# Patient Record
Sex: FEMALE | Race: WHITE | NOT HISPANIC OR LATINO | Employment: FULL TIME | URBAN - METROPOLITAN AREA
[De-identification: names, ages, dates, MRNs, and addresses within clinical notes are randomized per-mention and may not be internally consistent; named-entity substitution may affect disease eponyms.]

---

## 2017-06-10 ENCOUNTER — TRANSFERRED RECORDS (OUTPATIENT)
Dept: HEALTH INFORMATION MANAGEMENT | Facility: CLINIC | Age: 37
End: 2017-06-10

## 2017-07-03 LAB — PAP-ABSTRACT: NORMAL

## 2017-07-20 ENCOUNTER — OFFICE VISIT (OUTPATIENT)
Dept: FAMILY MEDICINE | Facility: CLINIC | Age: 37
End: 2017-07-20
Payer: COMMERCIAL

## 2017-07-20 VITALS
SYSTOLIC BLOOD PRESSURE: 134 MMHG | TEMPERATURE: 98.3 F | DIASTOLIC BLOOD PRESSURE: 92 MMHG | HEIGHT: 62 IN | WEIGHT: 220 LBS | BODY MASS INDEX: 40.48 KG/M2 | HEART RATE: 104 BPM

## 2017-07-20 DIAGNOSIS — Z00.00 ENCOUNTER FOR ROUTINE ADULT HEALTH EXAMINATION WITHOUT ABNORMAL FINDINGS: Primary | ICD-10-CM

## 2017-07-20 DIAGNOSIS — I10 BENIGN ESSENTIAL HYPERTENSION: ICD-10-CM

## 2017-07-20 DIAGNOSIS — R73.03 PRE-DIABETES: ICD-10-CM

## 2017-07-20 DIAGNOSIS — E66.01 MORBID OBESITY, UNSPECIFIED OBESITY TYPE (H): ICD-10-CM

## 2017-07-20 DIAGNOSIS — Z13.6 CARDIOVASCULAR SCREENING; LDL GOAL LESS THAN 130: ICD-10-CM

## 2017-07-20 DIAGNOSIS — R11.0 NAUSEA: ICD-10-CM

## 2017-07-20 DIAGNOSIS — Z23 NEED FOR TDAP VACCINATION: ICD-10-CM

## 2017-07-20 DIAGNOSIS — Z71.6 ENCOUNTER FOR SMOKING CESSATION COUNSELING: ICD-10-CM

## 2017-07-20 LAB — HBA1C MFR BLD: 5.9 % (ref 4.3–6)

## 2017-07-20 PROCEDURE — 90715 TDAP VACCINE 7 YRS/> IM: CPT | Performed by: NURSE PRACTITIONER

## 2017-07-20 PROCEDURE — 99213 OFFICE O/P EST LOW 20 MIN: CPT | Mod: 25 | Performed by: NURSE PRACTITIONER

## 2017-07-20 PROCEDURE — 83036 HEMOGLOBIN GLYCOSYLATED A1C: CPT | Performed by: NURSE PRACTITIONER

## 2017-07-20 PROCEDURE — 90471 IMMUNIZATION ADMIN: CPT | Performed by: NURSE PRACTITIONER

## 2017-07-20 PROCEDURE — 99395 PREV VISIT EST AGE 18-39: CPT | Mod: 25 | Performed by: NURSE PRACTITIONER

## 2017-07-20 PROCEDURE — 84443 ASSAY THYROID STIM HORMONE: CPT | Performed by: NURSE PRACTITIONER

## 2017-07-20 PROCEDURE — 80053 COMPREHEN METABOLIC PANEL: CPT | Performed by: NURSE PRACTITIONER

## 2017-07-20 PROCEDURE — 36415 COLL VENOUS BLD VENIPUNCTURE: CPT | Performed by: NURSE PRACTITIONER

## 2017-07-20 RX ORDER — VARENICLINE TARTRATE 1 MG/1
1 TABLET, FILM COATED ORAL 2 TIMES DAILY
Qty: 60 TABLET | Refills: 2 | Status: SHIPPED | OUTPATIENT
Start: 2017-07-20 | End: 2017-07-25

## 2017-07-20 RX ORDER — LISINOPRIL 10 MG/1
10 TABLET ORAL DAILY
Qty: 90 TABLET | Refills: 1 | Status: SHIPPED | OUTPATIENT
Start: 2017-07-20 | End: 2018-01-31

## 2017-07-20 RX ORDER — HYDROCHLOROTHIAZIDE 12.5 MG/1
25 TABLET ORAL DAILY
COMMUNITY
End: 2017-07-20

## 2017-07-20 RX ORDER — DEXTROAMPHETAMINE SACCHARATE, AMPHETAMINE ASPARTATE, DEXTROAMPHETAMINE SULFATE AND AMPHETAMINE SULFATE 5; 5; 5; 5 MG/1; MG/1; MG/1; MG/1
TABLET ORAL
COMMUNITY
Start: 2017-06-12 | End: 2017-11-01

## 2017-07-20 RX ORDER — ONDANSETRON 4 MG/1
4 TABLET, FILM COATED ORAL EVERY 6 HOURS PRN
Qty: 30 TABLET | Refills: 3 | Status: SHIPPED | OUTPATIENT
Start: 2017-07-20 | End: 2017-10-30

## 2017-07-20 RX ORDER — HYDROCHLOROTHIAZIDE 12.5 MG/1
12.5 TABLET ORAL DAILY
Qty: 90 TABLET | Refills: 1 | Status: SHIPPED | OUTPATIENT
Start: 2017-07-20 | End: 2018-01-31

## 2017-07-20 RX ORDER — NEBIVOLOL 10 MG/1
10 TABLET ORAL DAILY
Qty: 90 TABLET | Refills: 1 | Status: SHIPPED | OUTPATIENT
Start: 2017-07-20 | End: 2017-07-20

## 2017-07-20 RX ORDER — ONDANSETRON 4 MG/1
TABLET, FILM COATED ORAL
COMMUNITY
Start: 2017-07-05 | End: 2017-07-20

## 2017-07-20 RX ORDER — VARENICLINE TARTRATE 1 MG/1
1 TABLET, FILM COATED ORAL
COMMUNITY
Start: 2016-08-29 | End: 2017-07-20

## 2017-07-20 RX ORDER — PAROXETINE 20 MG/1
TABLET, FILM COATED ORAL
COMMUNITY
Start: 2017-06-12 | End: 2017-11-01

## 2017-07-20 RX ORDER — TRAZODONE HYDROCHLORIDE 50 MG/1
TABLET, FILM COATED ORAL
COMMUNITY
Start: 2017-06-29 | End: 2018-09-19

## 2017-07-20 NOTE — PATIENT INSTRUCTIONS
Start the lisinopril -recheck labs in 2 when fasting      Preventive Health Recommendations  Female Ages 26 - 39  Yearly exam:   See your health care provider every year in order to    Review health changes.     Discuss preventive care.      Review your medicines if you your doctor has prescribed any.    Until age 30: Get a Pap test every three years (more often if you have had an abnormal result).    After age 30: Talk to your doctor about whether you should have a Pap test every 3 years or have a Pap test with HPV screening every 5 years.   You do not need a Pap test if your uterus was removed (hysterectomy) and you have not had cancer.  You should be tested each year for STDs (sexually transmitted diseases), if you're at risk.   Talk to your provider about how often to have your cholesterol checked.  If you are at risk for diabetes, you should have a diabetes test (fasting glucose).  Shots: Get a flu shot each year. Get a tetanus shot every 10 years.   Nutrition:     Eat at least 5 servings of fruits and vegetables each day.    Eat whole-grain bread, whole-wheat pasta and brown rice instead of white grains and rice.    Talk to your provider about Calcium and Vitamin D.     Lifestyle    Exercise at least 150 minutes a week (30 minutes a day, 5 days of the week). This will help you control your weight and prevent disease.    Limit alcohol to one drink per day.    No smoking.     Wear sunscreen to prevent skin cancer.    See your dentist every six months for an exam and cleaning.

## 2017-07-20 NOTE — NURSING NOTE
"Chief Complaint   Patient presents with     Physical       Initial BP (!) 134/92 (Cuff Size: Adult Large)  Pulse 104  Temp 98.3  F (36.8  C) (Tympanic)  Ht 5' 2.25\" (1.581 m)  Wt 220 lb (99.8 kg)  LMP   BMI 39.92 kg/m2 Estimated body mass index is 39.92 kg/(m^2) as calculated from the following:    Height as of this encounter: 5' 2.25\" (1.581 m).    Weight as of this encounter: 220 lb (99.8 kg).  Medication Reconciliation: complete    Health Maintenance that is potentially due pending provider review:  Pap Smear    Pt had PAP done on this date 7/4/2017 , with this group [facility], results were normal        "

## 2017-07-20 NOTE — NURSING NOTE
Prior to injection verified patient identity using patient's name and date of birth.    Screening Questionnaire for Adult Immunization    Are you sick today?   No   Do you have allergies to medications, food, a vaccine component or latex?   No   Have you ever had a serious reaction after receiving a vaccination?   No   Do you have a long-term health problem with heart disease, lung disease, asthma, kidney disease, metabolic disease (e.g. diabetes), anemia, or other blood disorder?   No   Do you have cancer, leukemia, HIV/AIDS, or any other immune system problem?   No   In the past 3 months, have you taken medications that affect  your immune system, such as prednisone, other steroids, or anticancer drugs; drugs for the treatment of rheumatoid arthritis, Crohn s disease, or psoriasis; or have you had radiation treatments?   No   Have you had a seizure, or a brain or other nervous system problem?   No   During the past year, have you received a transfusion of blood or blood     products, or been given immune (gamma) globulin or antiviral drug?   No   For women: Are you pregnant or is there a chance you could become        pregnant during the next month?   No   Have you received any vaccinations in the past 4 weeks?   No     Immunization questionnaire answers were all negative.      MNVFC doesn't apply on this patient    Per orders of FAB Maldonado, injection of Tdap given by Hetal Sears. Patient instructed to remain in clinic for 15 minutes afterwards, and to report any adverse reaction to me immediately.       Screening performed by Hetal Sears on 7/20/2017 at 12:40 PM.

## 2017-07-20 NOTE — MR AVS SNAPSHOT
After Visit Summary   7/20/2017    Alma Mae    MRN: [id number]           Patient Information     Date Of Birth          1980        Visit Information        Provider Department      7/20/2017 11:20 AM Daniela Maldonado APRN [facility]        Today's Diagnoses     Pre-diabetes    -  1    Encounter for smoking cessation counseling        Encounter for routine adult health examination without abnormal findings        Benign essential hypertension        Nausea        CARDIOVASCULAR SCREENING; LDL GOAL LESS THAN 130          Care Instructions    Start the lisinopril -recheck labs in 2 when fasting      Preventive Health Recommendations  Female Ages 26 - 39  Yearly exam:   See your health care provider every year in order to    Review health changes.     Discuss preventive care.      Review your medicines if you your doctor has prescribed any.    Until age 30: Get a Pap test every three years (more often if you have had an abnormal result).    After age 30: Talk to your doctor about whether you should have a Pap test every 3 years or have a Pap test with HPV screening every 5 years.   You do not need a Pap test if your uterus was removed (hysterectomy) and you have not had cancer.  You should be tested each year for STDs (sexually transmitted diseases), if you're at risk.   Talk to your provider about how often to have your cholesterol checked.  If you are at risk for diabetes, you should have a diabetes test (fasting glucose).  Shots: Get a flu shot each year. Get a tetanus shot every 10 years.   Nutrition:     Eat at least 5 servings of fruits and vegetables each day.    Eat whole-grain bread, whole-wheat pasta and brown rice instead of white grains and rice.    Talk to your provider about Calcium and Vitamin D.     Lifestyle    Exercise at least 150 minutes a week (30 minutes a day, 5 days of the week). This will help you control your weight and prevent  "disease.    Limit alcohol to one drink per day.    No smoking.     Wear sunscreen to prevent skin cancer.    See your dentist every six months for an exam and cleaning.            Follow-ups after your visit        Future tests that were ordered for you today     Open Future Orders        Priority Expected Expires Ordered    Lipid panel reflex to direct LDL Routine  7/20/2018 7/20/2017    Basic metabolic panel Routine  7/20/2018 7/20/2017            Who to contact     If you have questions or need follow up information about today's clinic visit or your schedule please contact [facility] directly at [phone].  Normal or non-critical lab and imaging results will be communicated to you by Donordonuthart, letter or phone within 4 business days after the clinic has received the results. If you do not hear from us within 7 days, please contact the clinic through [facility] or phone. If you have a critical or abnormal lab result, we will notify you by phone as soon as possible.  Submit refill requests through [employer] or call your pharmacy and they will forward the refill request to us. Please allow 3 business days for your refill to be completed.          Additional Information About Your Visit        MyChart Information     [employer] gives you secure access to your electronic health record. If you see a primary care provider, you can also send messages to your care team and make appointments. If you have questions, please call your primary care clinic.  If you do not have a primary care provider, please call [phone] and they will assist you.        Care EveryWhere ID     This is your Care EveryWhere ID. This could be used by other organizations to access your [facility] records  [id number]        Your Vitals Were     Pulse Temperature Height BMI (Body Mass Index)          104 98.3  F (36.8  C) (Tympanic) 5' 2.25\" (1.581 m) 39.92 kg/m2         Blood Pressure from Last 3 Encounters:   07/20/17 (!) " 134/92    Weight from Last 3 Encounters:   07/20/17 220 lb (99.8 kg)              We Performed the Following     Comprehensive metabolic panel     Hemoglobin A1c     TSH with free T4 reflex          Today's Medication Changes          These changes are accurate as of: 7/20/17 12:31 PM.  If you have any questions, ask your nurse or doctor.               These medicines have changed or have updated prescriptions.        Dose/Directions    hydrochlorothiazide 12.5 MG Tabs tablet   This may have changed:  how much to take   Used for:  Benign essential hypertension   Changed by:  Daniela Maldonado APRN CNP        Dose:  12.5 mg   Take 1 tablet (12.5 mg) by mouth daily   Quantity:  90 tablet   Refills:  1       metFORMIN 500 MG tablet   Commonly known as:  GLUCOPHAGE   This may have changed:  when to take this   Used for:  Pre-diabetes   Changed by:  Daniela Maldonado APRN CNP        Dose:  500 mg   Take 1 tablet (500 mg) by mouth 2 times daily (with meals)   Quantity:  180 tablet   Refills:  1       ondansetron 4 MG tablet   Commonly known as:  ZOFRAN   This may have changed:  See the new instructions.   Used for:  Nausea   Changed by:  Daniela Maldonado APRN CNP        Dose:  4 mg   Take 1 tablet (4 mg) by mouth every 6 hours as needed for nausea   Quantity:  30 tablet   Refills:  3       varenicline 1 MG tablet   Commonly known as:  CHANTIX   This may have changed:  when to take this   Used for:  Encounter for smoking cessation counseling   Changed by:  Daniela Maldonado APRN CNP        Dose:  1 mg   Take 1 tablet (1 mg) by mouth 2 times daily   Quantity:  60 tablet   Refills:  2         Stop taking these medicines if you haven't already. Please contact your care team if you have questions.     BYSTOLIC PO   Stopped by:  Daniela Maldonado APRN CNP                Where to get your medicines      These medications were sent to [facility] [address] [place] [address] [address] [facility] AT  [address]  [address] [facility] [address]     Phone:  [phone]     hydrochlorothiazide 12.5 MG Tabs tablet    metFORMIN 500 MG tablet    ondansetron 4 MG tablet    varenicline 1 MG tablet                Primary Care Provider    None Specified       No primary provider on file.        Equal Access to Services     FRANCIA WELCH : Hadii analia collado hadasho Soomaali, waaxda luqadaha, qaybta kaalmada elishayada, shlomo srivastava. So [facility] [phone].    ATENCIÓN: Si habla español, tiene a graham disposición servicios gratuitos de asistencia lingüística. [facility] [phone].    We comply with applicable federal civil rights laws and [place] laws. We do not discriminate on the basis of race, color, national origin, age, disability sex, sexual orientation or gender identity.            Thank you!     Thank you for choosing [facility]  for your care. Our goal is always to provide you with excellent care. Hearing back from our patients is one way we can continue to improve our services. Please take a few minutes to complete the written survey that you may receive in the mail after your visit with us. Thank you!             Your Updated Medication List - Protect others around you: Learn how to safely use, store and throw away your medicines at www.disposemymeds.org.          This list is accurate as of: 7/20/17 12:31 PM.  Always use your most recent med list.                   Brand Name Dispense Instructions for use Diagnosis    amphetamine-dextroamphetamine 20 MG per tablet    ADDERALL     TK ONE T PO BID        hydrochlorothiazide 12.5 MG Tabs tablet     90 tablet    Take 1 tablet (12.5 mg) by mouth daily    Benign essential hypertension       metFORMIN 500 MG tablet    GLUCOPHAGE    180 tablet    Take 1 tablet (500 mg) by mouth 2 times daily (with meals)    Pre-diabetes       ondansetron 4 MG tablet    ZOFRAN    30 tablet    Take 1 tablet (4 mg) by  mouth every 6 hours as needed for nausea    Nausea       PARoxetine 20 MG tablet    PAXIL          traZODone 50 MG tablet    DESYREL     TK 1/2 TO 1 T PO QHS PRN        varenicline 1 MG tablet    CHANTIX    60 tablet    Take 1 tablet (1 mg) by mouth 2 times daily    Encounter for smoking cessation counseling

## 2017-07-20 NOTE — LETTER
Alma Hamptonder  [address] [facility] [address]        July 25, 2017          Dear ,    We are writing to inform you of your test results.    Your labs all looked good.  Your glucose was slightly elevated but you were not fasting so it was normal.    Resulted Orders   Comprehensive metabolic panel   Result Value Ref Range    Sodium 136 133 - 144 mmol/L    Potassium 4.2 3.4 - 5.3 mmol/L    Chloride 102 94 - 109 mmol/L    Carbon Dioxide 27 20 - 32 mmol/L    Anion Gap 7 3 - 14 mmol/L    Glucose 100 (H) 70 - 99 mg/dL    Urea Nitrogen 12 7 - 30 mg/dL    Creatinine 0.68 0.52 - 1.04 mg/dL    GFR Estimate >90  Non  GFR Calc   >60 mL/min/1.7m2    GFR Estimate If Black >90   GFR Calc   >60 mL/min/1.7m2    Calcium 8.9 8.5 - 10.1 mg/dL    Bilirubin Total 0.3 0.2 - 1.3 mg/dL    Albumin 4.2 3.4 - 5.0 g/dL    Protein Total 7.3 6.8 - 8.8 g/dL    Alkaline Phosphatase 67 40 - 150 U/L    ALT 46 0 - 50 U/L    AST 24 0 - 45 U/L   Hemoglobin A1c   Result Value Ref Range    Hemoglobin A1C 5.9 4.3 - 6.0 %   TSH with free T4 reflex   Result Value Ref Range    TSH 1.07 0.40 - 4.00 mU/L       If you have any questions or concerns, please call the clinic at the number listed above.       Sincerely,        SHRUTHI Arellano CNP

## 2017-07-20 NOTE — PROGRESS NOTES
SUBJECTIVE:   CC: Alma Mae is an 37 year old woman who presents for preventive health visit.     Healthy Habits:    Do you get at least three servings of calcium containing foods daily (dairy, green leafy vegetables, etc.)? yes    Amount of exercise or daily activities, outside of work: 2 day(s) per week    Problems taking medications regularly No    Medication side effects: Nausea for adderall     Have you had an eye exam in the past two years? yes    Do you see a dentist twice per year? yes  Do you have sleep apnea, excessive snoring or daytime drowsiness?no      Diabetes Follow-up      Patient is checking blood sugars: 1 times a week    Diabetic concerns: None     Symptoms of hypoglycemia (low blood sugar): none     Paresthesias (numbness or burning in feet) or sores: No     Date of last diabetic eye exam: 2015     Hypertension Follow-up      Outpatient blood pressures are being checked at home.  Results are 120/80-90's.- pt feels diastolic has been up since starting adderall     Low Salt Diet: low salt  Has not been taking her medications-watching numbers at home.    Medication Followup of Chantix     Taking Medication as prescribed: NO     Side Effects:  Vivid dreams     Medication Helping Symptoms:  Yes - helps with not smoking      Nausea  Gets nauseous with Adderall takes Zofran as needed for nausea.     Today's PHQ-2 Score:   PHQ-2 ( 1999 Pfizer) 7/20/2017   Q1: Little interest or pleasure in doing things 0   Q2: Feeling down, depressed or hopeless 0   PHQ-2 Score 0         Abuse: Current or Past(Physical, Sexual or Emotional)- No  Do you feel safe in your environment - Yes  Social History   Substance Use Topics     Smoking status: Current Every Day Smoker     Types: Cigarettes     Smokeless tobacco: Never Used     Alcohol use No     The patient does not drink >3 drinks per day nor >7 drinks per week.    Reviewed orders with patient.  Reviewed health maintenance and updated orders accordingly -  "Yes  Labs reviewed in EPIC    Mammogram not appropriate for this patient based on age.    Pertinent mammograms are reviewed under the imaging tab.  History of abnormal Pap smear: NO - age 30- 65 PAP every 3 years recommended    Reviewed and updated as needed this visit by clinical staff  Tobacco  Med Hx  Surg Hx  Fam Hx  Soc Hx        Reviewed and updated as needed this visit by Provider              ROS:CONSTITUTIONAL:NEGATIVE for fever, chills and POSITIVE  for weight gain  I: NEGATIVE for worrisome rashes, moles or lesions  E: NEGATIVE for vision changes or irritation  ENT: NEGATIVE for ear, mouth and throat problems  R: NEGATIVE for significant cough or SOB  B: NEGATIVE for masses, tenderness or discharge  CV: NEGATIVE for chest pain, palpitations or peripheral edema  GI: NEGATIVE for nausea, abdominal pain, heartburn, or change in bowel habits  : NEGATIVE for unusual urinary or vaginal symptoms. Periods are regular.  M: NEGATIVE for significant arthralgias or myalgia  N: NEGATIVE for weakness, dizziness or paresthesias  P: NEGATIVE for changes in mood or affect    OBJECTIVE:   BP (!) 134/92 (Cuff Size: Adult Large)  Pulse 104  Temp 98.3  F (36.8  C) (Tympanic)  Ht 5' 2.25\" (1.581 m)  Wt 220 lb (99.8 kg)  LMP   BMI 39.92 kg/m2  EXAM:  GENERAL: healthy, alert and no distress  EYES: Eyes grossly normal to inspection, PERRL and conjunctivae and sclerae normal  HENT: ear canals and TM's normal, nose and mouth without ulcers or lesions  NECK: no adenopathy, no asymmetry, masses, or scars and thyroid normal to palpation  RESP: lungs clear to auscultation - no rales, rhonchi or wheezes  CV: regular rate and rhythm, normal S1 S2, no S3 or S4, no murmur, click or rub, no peripheral edema and peripheral pulses strong  ABDOMEN: soft, nontender, no hepatosplenomegaly, no masses and bowel sounds normal  MS: no gross musculoskeletal defects noted, no edema  SKIN: no suspicious lesions or rashes  NEURO: Normal " "strength and tone, mentation intact and speech normal  PSYCH: mentation appears normal, affect normal/bright    ASSESSMENT/PLAN:   1. Encounter for routine adult health examination without abnormal findings    - TSH with free T4 reflex    2. Benign essential hypertension  Not controlled.  Start the Lisinopril for blood pressure. Labs and recheck in 2 weeks.  - hydrochlorothiazide 12.5 MG TABS tablet; Take 1 tablet (12.5 mg) by mouth daily  Dispense: 90 tablet; Refill: 1  - Basic metabolic panel; Future  - lisinopril (PRINIVIL/ZESTRIL) 10 MG tablet; Take 1 tablet (10 mg) by mouth daily  Dispense: 90 tablet; Refill: 1    3. Pre-diabetes  Controlled on medication.  A1C 5.9  - metFORMIN (GLUCOPHAGE) 500 MG tablet; Take 1 tablet (500 mg) by mouth 2 times daily (with meals)  Dispense: 180 tablet; Refill: 1  - Comprehensive metabolic panel  - Hemoglobin A1c    4. Morbid obesity, unspecified obesity type (H)  Discussion on starting to increase physical activity and eating well.  Works from home and get very little physical activity.    5. Encounter for smoking cessation counseling  Chantix refilled.    6. Nausea  Take Zofran as needed with Adderall-makes her nauseous.  - ondansetron (ZOFRAN) 4 MG tablet; Take 1 tablet (4 mg) by mouth every 6 hours as needed for nausea  Dispense: 30 tablet; Refill: 3    7. CARDIOVASCULAR SCREENING; LDL GOAL LESS THAN 130    - Lipid panel reflex to direct LDL; Future    8. Need for Tdap vaccination    - TDAP VACCINE (ADACEL)    COUNSELING:   Reviewed preventive health counseling, as reflected in patient instructions         reports that she has been smoking Cigarettes.  She has never used smokeless tobacco.  Tobacco Cessation Action Plan: Pharmacotherapies : Chantix  Estimated body mass index is 39.92 kg/(m^2) as calculated from the following:    Height as of this encounter: 5' 2.25\" (1.581 m).    Weight as of this encounter: 220 lb (99.8 kg).   Weight management plan: Discussed healthy diet " and exercise guidelines and patient will follow up in 12 months in clinic to re-evaluate.    Counseling Resources:  ATP IV Guidelines  Pooled Cohorts Equation Calculator  Breast Cancer Risk Calculator  FRAX Risk Assessment  ICSI Preventive Guidelines  Dietary Guidelines for Americans, 2010  USDA's MyPlate  ASA Prophylaxis  Lung CA Screening    SHRUTHI Arellano [facility]

## 2017-07-21 LAB
ALBUMIN SERPL-MCNC: 4.2 G/DL (ref 3.4–5)
ALP SERPL-CCNC: 67 U/L (ref 40–150)
ALT SERPL W P-5'-P-CCNC: 46 U/L (ref 0–50)
ANION GAP SERPL CALCULATED.3IONS-SCNC: 7 MMOL/L (ref 3–14)
AST SERPL W P-5'-P-CCNC: 24 U/L (ref 0–45)
BILIRUB SERPL-MCNC: 0.3 MG/DL (ref 0.2–1.3)
BUN SERPL-MCNC: 12 MG/DL (ref 7–30)
CALCIUM SERPL-MCNC: 8.9 MG/DL (ref 8.5–10.1)
CHLORIDE SERPL-SCNC: 102 MMOL/L (ref 94–109)
CO2 SERPL-SCNC: 27 MMOL/L (ref 20–32)
CREAT SERPL-MCNC: 0.68 MG/DL (ref 0.52–1.04)
GFR SERPL CREATININE-BSD FRML MDRD: ABNORMAL ML/MIN/1.7M2
GLUCOSE SERPL-MCNC: 100 MG/DL (ref 70–99)
POTASSIUM SERPL-SCNC: 4.2 MMOL/L (ref 3.4–5.3)
PROT SERPL-MCNC: 7.3 G/DL (ref 6.8–8.8)
SODIUM SERPL-SCNC: 136 MMOL/L (ref 133–144)
TSH SERPL DL<=0.005 MIU/L-ACNC: 1.07 MU/L (ref 0.4–4)

## 2017-07-25 ENCOUNTER — MYC REFILL (OUTPATIENT)
Dept: FAMILY MEDICINE | Facility: CLINIC | Age: 37
End: 2017-07-25

## 2017-07-25 ENCOUNTER — MYC MEDICAL ADVICE (OUTPATIENT)
Dept: FAMILY MEDICINE | Facility: CLINIC | Age: 37
End: 2017-07-25

## 2017-07-25 DIAGNOSIS — Z71.6 ENCOUNTER FOR SMOKING CESSATION COUNSELING: ICD-10-CM

## 2017-07-26 RX ORDER — VARENICLINE TARTRATE 1 MG/1
1 TABLET, FILM COATED ORAL 2 TIMES DAILY
Qty: 60 TABLET | Refills: 0 | Status: SHIPPED | OUTPATIENT
Start: 2017-07-26 | End: 2017-10-30

## 2017-07-26 RX ORDER — VARENICLINE TARTRATE 1 MG/1
1 TABLET, FILM COATED ORAL 2 TIMES DAILY
Qty: 60 TABLET | Refills: 0 | Status: SHIPPED | OUTPATIENT
Start: 2017-07-26 | End: 2017-07-26

## 2017-07-26 NOTE — TELEPHONE ENCOUNTER
Message from MyChart:  Original authorizing provider: SHRUTHI Arellano CNPregina Hamptonder would like a refill of the following medications:  varenicline (CHANTIX) 1 MG tablet [SHRUTHI Arellano CNP]    Preferred pharmacy: [facility] [address] [place] [address] AT [address]    Comment:

## 2017-07-27 PROBLEM — I10 BENIGN ESSENTIAL HYPERTENSION: Status: ACTIVE | Noted: 2017-07-27

## 2017-07-27 PROBLEM — R73.03 PRE-DIABETES: Status: ACTIVE | Noted: 2017-07-27

## 2017-07-27 PROBLEM — E66.01 MORBID OBESITY (H): Status: ACTIVE | Noted: 2017-07-27

## 2017-08-15 ENCOUNTER — MYC MEDICAL ADVICE (OUTPATIENT)
Dept: FAMILY MEDICINE | Facility: CLINIC | Age: 37
End: 2017-08-15

## 2017-09-25 ENCOUNTER — MYC MEDICAL ADVICE (OUTPATIENT)
Dept: FAMILY MEDICINE | Facility: CLINIC | Age: 37
End: 2017-09-25

## 2017-09-26 ENCOUNTER — ALLIED HEALTH/NURSE VISIT (OUTPATIENT)
Dept: FAMILY MEDICINE | Facility: CLINIC | Age: 37
End: 2017-09-26
Payer: COMMERCIAL

## 2017-09-26 VITALS — DIASTOLIC BLOOD PRESSURE: 74 MMHG | SYSTOLIC BLOOD PRESSURE: 131 MMHG

## 2017-09-26 DIAGNOSIS — Z13.6 CARDIOVASCULAR SCREENING; LDL GOAL LESS THAN 130: ICD-10-CM

## 2017-09-26 DIAGNOSIS — I10 BENIGN ESSENTIAL HYPERTENSION: Primary | ICD-10-CM

## 2017-09-26 DIAGNOSIS — I10 BENIGN ESSENTIAL HYPERTENSION: ICD-10-CM

## 2017-09-26 LAB
ANION GAP SERPL CALCULATED.3IONS-SCNC: 3 MMOL/L (ref 3–14)
BUN SERPL-MCNC: 9 MG/DL (ref 7–30)
CALCIUM SERPL-MCNC: 8.5 MG/DL (ref 8.5–10.1)
CHLORIDE SERPL-SCNC: 104 MMOL/L (ref 94–109)
CHOLEST SERPL-MCNC: 212 MG/DL
CO2 SERPL-SCNC: 28 MMOL/L (ref 20–32)
CREAT SERPL-MCNC: 0.66 MG/DL (ref 0.52–1.04)
GFR SERPL CREATININE-BSD FRML MDRD: >90 ML/MIN/1.7M2
GLUCOSE SERPL-MCNC: 121 MG/DL (ref 70–99)
HDLC SERPL-MCNC: 28 MG/DL
LDLC SERPL CALC-MCNC: 114 MG/DL
NONHDLC SERPL-MCNC: 184 MG/DL
POTASSIUM SERPL-SCNC: 3.8 MMOL/L (ref 3.4–5.3)
SODIUM SERPL-SCNC: 135 MMOL/L (ref 133–144)
TRIGL SERPL-MCNC: 350 MG/DL

## 2017-09-26 PROCEDURE — 80048 BASIC METABOLIC PNL TOTAL CA: CPT | Performed by: NURSE PRACTITIONER

## 2017-09-26 PROCEDURE — 80061 LIPID PANEL: CPT | Performed by: NURSE PRACTITIONER

## 2017-09-26 PROCEDURE — 99207 ZZC NO CHARGE NURSE ONLY: CPT | Performed by: NURSE PRACTITIONER

## 2017-09-26 PROCEDURE — 36415 COLL VENOUS BLD VENIPUNCTURE: CPT | Performed by: NURSE PRACTITIONER

## 2017-10-15 DIAGNOSIS — R73.03 PRE-DIABETES: ICD-10-CM

## 2017-10-15 NOTE — TELEPHONE ENCOUNTER
metFORMIN (GLUCOPHAGE) 500 MG tablet         Last Written Prescription Date: 7/20/17  Last Fill Quantity: 180, # refills: 1  Last Office Visit with [facility], [email or web address] or [facility] prescribing provider:  7/20/17        BP Readings from Last 3 Encounters:   09/26/17 131/74   07/20/17 (!) 134/92     No results found for: MICROL  No results found for: UMALCR  Creatinine   Date Value Ref Range Status   09/26/2017 0.66 0.52 - 1.04 mg/dL Final   ]  GFR Estimate   Date Value Ref Range Status   09/26/2017 >90 >60 mL/min/1.7m2 Final     Comment:     Non  GFR Calc   07/20/2017 >90  Non  GFR Calc   >60 mL/min/1.7m2 Final     GFR Estimate If Black   Date Value Ref Range Status   09/26/2017 >90 >60 mL/min/1.7m2 Final     Comment:      GFR Calc   07/20/2017 >90   GFR Calc   >60 mL/min/1.7m2 Final     Lab Results   Component Value Date    CHOL 212 09/26/2017     Lab Results   Component Value Date    HDL 28 09/26/2017     Lab Results   Component Value Date     09/26/2017     Lab Results   Component Value Date    TRIG 350 09/26/2017     No results found for: CHOLHDLRATIO  Lab Results   Component Value Date    AST 24 07/20/2017     Lab Results   Component Value Date    ALT 46 07/20/2017     Lab Results   Component Value Date    A1C 5.9 07/20/2017     Potassium   Date Value Ref Range Status   09/26/2017 3.8 3.4 - 5.3 mmol/L Final     Cha ANNE(DANTE)

## 2017-10-30 ENCOUNTER — MYC MEDICAL ADVICE (OUTPATIENT)
Dept: FAMILY MEDICINE | Facility: CLINIC | Age: 37
End: 2017-10-30

## 2017-10-30 ENCOUNTER — MYC REFILL (OUTPATIENT)
Dept: FAMILY MEDICINE | Facility: CLINIC | Age: 37
End: 2017-10-30

## 2017-10-30 DIAGNOSIS — Z71.6 ENCOUNTER FOR SMOKING CESSATION COUNSELING: ICD-10-CM

## 2017-10-30 DIAGNOSIS — R11.0 NAUSEA: ICD-10-CM

## 2017-10-31 RX ORDER — VARENICLINE TARTRATE 1 MG/1
1 TABLET, FILM COATED ORAL 2 TIMES DAILY
Qty: 60 TABLET | Refills: 3 | Status: SHIPPED | OUTPATIENT
Start: 2017-10-31 | End: 2018-02-09

## 2017-10-31 RX ORDER — ONDANSETRON 4 MG/1
4 TABLET, FILM COATED ORAL EVERY 6 HOURS PRN
Qty: 30 TABLET | Refills: 3 | Status: SHIPPED | OUTPATIENT
Start: 2017-10-31 | End: 2018-02-09

## 2017-10-31 NOTE — TELEPHONE ENCOUNTER
Message from MyChart:  Original authorizing provider: SHRUTHI Arellnao CNPregina Mae would like a refill of the following medications:  ondansetron (ZOFRAN) 4 MG tablet [SHRUTHI Arellano CNP]  varenicline (CHANTIX) 1 MG tablet [SHRUTHI Arellano CNP]    Preferred pharmacy: [facility] [address] [place] [address] AT [address]    Comment:  Can you also please refill my adderal and paxil that we discussed at my last visit? I printed you my medical records from [facility] with the MH assessment and stated prescriptions. Thank you.

## 2017-11-01 DIAGNOSIS — F90.0 ADHD (ATTENTION DEFICIT HYPERACTIVITY DISORDER), INATTENTIVE TYPE: ICD-10-CM

## 2017-11-01 DIAGNOSIS — F33.0 MILD EPISODE OF RECURRENT MAJOR DEPRESSIVE DISORDER (H): Primary | ICD-10-CM

## 2017-11-02 RX ORDER — PAROXETINE 20 MG/1
20 TABLET, FILM COATED ORAL EVERY MORNING
Qty: 30 TABLET | Refills: 0 | Status: SHIPPED | OUTPATIENT
Start: 2017-11-02 | End: 2017-11-29

## 2017-11-02 RX ORDER — DEXTROAMPHETAMINE SACCHARATE, AMPHETAMINE ASPARTATE, DEXTROAMPHETAMINE SULFATE AND AMPHETAMINE SULFATE 5; 5; 5; 5 MG/1; MG/1; MG/1; MG/1
TABLET ORAL
Qty: 60 TABLET | Refills: 0 | Status: SHIPPED | OUTPATIENT
Start: 2017-11-02 | End: 2017-12-04

## 2017-11-02 RX ORDER — DEXTROAMPHETAMINE SACCHARATE, AMPHETAMINE ASPARTATE, DEXTROAMPHETAMINE SULFATE AND AMPHETAMINE SULFATE 5; 5; 5; 5 MG/1; MG/1; MG/1; MG/1
TABLET ORAL
Qty: 60 TABLET | Refills: 0 | Status: SHIPPED | OUTPATIENT
Start: 2017-11-02 | End: 2017-11-02

## 2017-11-02 NOTE — TELEPHONE ENCOUNTER
Left msg for pt to see if script should be taken to [facility] or left at .    Chelsie Garcia, [place]

## 2017-11-02 NOTE — TELEPHONE ENCOUNTER
Patient would like her script to be walked over to the [facility]. Thanks.    Melissa [place]

## 2017-11-02 NOTE — TELEPHONE ENCOUNTER
Please notify Alma that I will fill for 1 month but we need to discuss further for me to give additional prescriptions.  She can  the Adderall prescription at the .    Thanks,     SHRUTHI Arellano CNP

## 2017-11-08 ENCOUNTER — OFFICE VISIT (OUTPATIENT)
Dept: FAMILY MEDICINE | Facility: CLINIC | Age: 37
End: 2017-11-08
Payer: COMMERCIAL

## 2017-11-08 VITALS
BODY MASS INDEX: 39.56 KG/M2 | DIASTOLIC BLOOD PRESSURE: 80 MMHG | OXYGEN SATURATION: 97 % | TEMPERATURE: 98.4 F | HEIGHT: 62 IN | RESPIRATION RATE: 16 BRPM | WEIGHT: 215 LBS | SYSTOLIC BLOOD PRESSURE: 122 MMHG | HEART RATE: 110 BPM

## 2017-11-08 DIAGNOSIS — J01.90 ACUTE SINUSITIS WITH SYMPTOMS > 10 DAYS: Primary | ICD-10-CM

## 2017-11-08 DIAGNOSIS — M25.50 MULTIPLE JOINT PAIN: ICD-10-CM

## 2017-11-08 LAB
CRP SERPL-MCNC: 12.8 MG/L (ref 0–8)
ERYTHROCYTE [SEDIMENTATION RATE] IN BLOOD BY WESTERGREN METHOD: 8 MM/H (ref 0–20)

## 2017-11-08 PROCEDURE — 36415 COLL VENOUS BLD VENIPUNCTURE: CPT | Performed by: NURSE PRACTITIONER

## 2017-11-08 PROCEDURE — 86618 LYME DISEASE ANTIBODY: CPT | Performed by: NURSE PRACTITIONER

## 2017-11-08 PROCEDURE — 86431 RHEUMATOID FACTOR QUANT: CPT | Performed by: NURSE PRACTITIONER

## 2017-11-08 PROCEDURE — 99213 OFFICE O/P EST LOW 20 MIN: CPT | Performed by: NURSE PRACTITIONER

## 2017-11-08 PROCEDURE — 85652 RBC SED RATE AUTOMATED: CPT | Performed by: NURSE PRACTITIONER

## 2017-11-08 PROCEDURE — 86140 C-REACTIVE PROTEIN: CPT | Performed by: NURSE PRACTITIONER

## 2017-11-08 RX ORDER — DOXYCYCLINE 100 MG/1
100 CAPSULE ORAL 2 TIMES DAILY
Qty: 20 CAPSULE | Refills: 0 | Status: SHIPPED | OUTPATIENT
Start: 2017-11-08 | End: 2017-11-18

## 2017-11-08 ASSESSMENT — ANXIETY QUESTIONNAIRES
1. FEELING NERVOUS, ANXIOUS, OR ON EDGE: SEVERAL DAYS
6. BECOMING EASILY ANNOYED OR IRRITABLE: NOT AT ALL
2. NOT BEING ABLE TO STOP OR CONTROL WORRYING: SEVERAL DAYS
GAD7 TOTAL SCORE: 5
5. BEING SO RESTLESS THAT IT IS HARD TO SIT STILL: SEVERAL DAYS
3. WORRYING TOO MUCH ABOUT DIFFERENT THINGS: SEVERAL DAYS
7. FEELING AFRAID AS IF SOMETHING AWFUL MIGHT HAPPEN: NOT AT ALL

## 2017-11-08 ASSESSMENT — PATIENT HEALTH QUESTIONNAIRE - PHQ9
SUM OF ALL RESPONSES TO PHQ QUESTIONS 1-9: 3
5. POOR APPETITE OR OVEREATING: SEVERAL DAYS

## 2017-11-08 NOTE — NURSING NOTE
"Chief Complaint   Patient presents with     Sinus Problem       Initial /80  Pulse 110  Temp 98.4  F (36.9  C) (Tympanic)  Resp 16  Ht 5' 2.25\" (1.581 m)  Wt 215 lb (97.5 kg)  SpO2 97%  BMI 39.01 kg/m2 Estimated body mass index is 39.01 kg/(m^2) as calculated from the following:    Height as of this encounter: 5' 2.25\" (1.581 m).    Weight as of this encounter: 215 lb (97.5 kg).  Medication Reconciliation: complete    Health Maintenance that is potentially due pending provider review:  PHQ9 and GAD7        Is there anyone who you would like to be able to receive your results? If yes have patient fill out EDWARD    "

## 2017-11-08 NOTE — PATIENT INSTRUCTIONS
Start antibiotics for 10 days   Make sure you are getting a lot of rest and fluids  Ibuprofen and/or tylenol for discomfort or fever  Warm salt water gargles 3-4 times a day for sore throat   Cool mist vaporizer at night   If you can tolerate you can use a nasal saline flush such as the Oneida Pot  Sleep with head of bed up at night to promote drainage     Labs today for joint pain - will update you on results     Return to clinic if not seeing improvement in sinuses         Sinusitis (Antibiotic Treatment)    The sinuses are air-filled spaces within the bones of the face. They connect to the inside of the nose. Sinusitis is an inflammation of the tissue lining the sinus cavity. Sinus inflammation can occur during a cold. It can also be due to allergies to pollens and other particles in the air. Sinusitis can cause symptoms of sinus congestion and fullness. A sinus infection causes fever, headache and facial pain. There is often green or yellow drainage from the nose or into the back of the throat (post-nasal drip). You have been given antibiotics to treat this condition.  Home care:    Take the full course of antibiotics as instructed. Do not stop taking them, even if you feel better.    Drink plenty of water, hot tea, and other liquids. This may help thin mucus. It also may promote sinus drainage.    Heat may help soothe painful areas of the face. Use a towel soaked in hot water. Or,  the shower and direct the hot spray onto your face. Using a vaporizer along with a menthol rub at night may also help.     An expectorant containing guaifenesin may help thin the mucus and promote drainage from the sinuses.    Over-the-counter decongestants may be used unless a similar medicine was prescribed. Nasal sprays work the fastest. Use one that contains phenylephrine or oxymetazoline. First blow the nose gently. Then use the spray. Do not use these medicines more often than directed on the label or symptoms may get  worse. You may also use tablets containing pseudoephedrine. Avoid products that combine ingredients, because side effects may be increased. Read labels. You can also ask the pharmacist for help. (NOTE: Persons with high blood pressure should not use decongestants. They can raise blood pressure.)    Over-the-counter antihistamines may help if allergies contributed to your sinusitis.      Do not use nasal rinses or irrigation during an acute sinus infection, unless told to by your health care provider. Rinsing may spread the infection to other sinuses.    Use acetaminophen or ibuprofen to control pain, unless another pain medicine was prescribed. (If you have chronic liver or kidney disease or ever had a stomach ulcer, talk with your doctor before using these medicines. Aspirin should never be used in anyone under 18 years of age who is ill with a fever. It may cause severe liver damage.)    Don't smoke. This can worsen symptoms.  Follow-up care  Follow up with your healthcare provider or our staff if you are not improving within the next week.  When to seek medical advice  Call your healthcare provider if any of these occur:    Facial pain or headache becoming more severe    Stiff neck    Unusual drowsiness or confusion    Swelling of the forehead or eyelids    Vision problems, including blurred or double vision    Fever of 100.4 F (38 C) or higher, or as directed by your healthcare provider    Seizure    Breathing problems    Symptoms not resolving within 10 days  Date Last Reviewed: 4/13/2015 2000-2017 [employer]. [address] [place] [id number]. All rights reserved. This information is not intended as a substitute for professional medical care. Always follow your healthcare professional's instructions.

## 2017-11-08 NOTE — PROGRESS NOTES
SUBJECTIVE:   Alma Mae is a 37 year old female who presents to clinic today for the following health issues:      ENT Symptoms             Symptoms: cc Present Absent Comment   Fever/Chills  x  101.7 last yesterday afternoon   Fatigue  x     Muscle Aches  x     Eye Irritation   x    Sneezing  x     Nasal Quinten/Drg  x     Sinus Pressure/Pain x x  Rt side around eye   Loss of smell  x     Dental pain  x     Sore Throat  s  dry   Swollen Glands   x    Ear Pain/Fullness   x    Cough  x     Wheeze  x  Past 4 days at night   Chest Pain   x    Shortness of breath   x    Rash   x    Other         Symptom duration:  11 dyas   Symptom severity:  mod   Treatments tried:  Mucinex Dayquil Nyfquil Robitussin    Contacts:       Joint pain  Patient has had 2-3 month history of joint aches/pains in knees, wrists, hips and shoulders  Has had no significant change in activity and no trauma or injury  Did just move from [place] and Lymes is very prevalent and has multiple dogs that have had ticks. Patient has had multiple ticks in the past several months, has not had a bullseye rash, but does get a rash. Patient does have a family history of RA      Problem list and histories reviewed & adjusted, as indicated.  Additional history: as documented    Patient Active Problem List   Diagnosis     Morbid obesity (H)     Benign essential hypertension     Pre-diabetes     History reviewed. No pertinent surgical history.    Social History   Substance Use Topics     Smoking status: Former Smoker     Types: Cigarettes     Quit date: 7/8/2017     Smokeless tobacco: Never Used     Alcohol use No     Family History   Problem Relation Age of Onset     Hypertension Mother      Anxiety Disorder Mother      DIABETES Father      Depression Father      Anxiety Disorder Son      Depression Son          Current Outpatient Prescriptions   Medication Sig Dispense Refill     doxycycline (VIBRAMYCIN) 100 MG capsule Take 1 capsule (100 mg) by mouth  "2 times daily for 10 days 20 capsule 0     PARoxetine (PAXIL) 20 MG tablet Take 1 tablet (20 mg) by mouth every morning 30 tablet 0     amphetamine-dextroamphetamine (ADDERALL) 20 MG per tablet TK ONE T PO BID 60 tablet 0     ondansetron (ZOFRAN) 4 MG tablet Take 1 tablet (4 mg) by mouth every 6 hours as needed for nausea 30 tablet 3     varenicline (CHANTIX) 1 MG tablet Take 1 tablet (1 mg) by mouth 2 times daily 60 tablet 3     metFORMIN (GLUCOPHAGE) 500 MG tablet Take 1 tablet (500 mg) by mouth 2 times daily (with meals) 180 tablet 0     traZODone (DESYREL) 50 MG tablet TK 1/2 TO 1 T PO QHS PRN       hydrochlorothiazide 12.5 MG TABS tablet Take 1 tablet (12.5 mg) by mouth daily 90 tablet 1     lisinopril (PRINIVIL/ZESTRIL) 10 MG tablet Take 1 tablet (10 mg) by mouth daily 90 tablet 1     Allergies   Allergen Reactions     Penicillins Rash     Sulfa Drugs Rash         Reviewed and updated as needed this visit by clinical staffTobacco  Allergies  Meds  Problems  Med Hx  Surg Hx  Fam Hx  Soc Hx        Reviewed and updated as needed this visit by Provider  Tobacco  Allergies  Meds  Problems  Med Hx  Surg Hx  Fam Hx  Soc Hx          ROS:  Constitutional, HEENT, cardiovascular, pulmonary, gi and gu systems are negative, except as otherwise noted.      OBJECTIVE:   /80  Pulse 110  Temp 98.4  F (36.9  C) (Tympanic)  Resp 16  Ht 5' 2.25\" (1.581 m)  Wt 215 lb (97.5 kg)  SpO2 97%  BMI 39.01 kg/m2  Body mass index is 39.01 kg/(m^2).  GENERAL APPEARANCE: healthy, alert and no distress  EYES: Eyes grossly normal to inspection, PERRL and conjunctivae and sclerae normal  HENT: ear canals normal and left TM with serous fluid, right TM partially occluded by drainage/cerumen and TM mildly erythematous with serous fluid, nose and mouth without ulcers or lesions, nasal mucosa slightly edematous without rhinorrhea and bleeding sore in inner right nare, frontal sinus tenderness bilateral, maxillary sinus " tenderness bilateral and post nasal drainage   NECK: no adenopathy, no asymmetry, masses, or scars and thyroid normal to palpation  RESP: lungs clear to auscultation - no rales, rhonchi or wheezes  CV: regular rates and rhythm, normal S1 S2, no S3 or S4 and no murmur, click or rub  MS: Full range of motion of all extremities, good strength through out upper and lower extremities and neck without pain. No crepitus or swelling of joints noted, peripheral pulses normal, some mild swelling of all digits bilateral    Diagnostic Test Results:  none     ASSESSMENT/PLAN:     1. Acute sinusitis with symptoms > 10 days  Antibiotics with supportive cares, return to clinic if not improved.   - doxycycline (VIBRAMYCIN) 100 MG capsule; Take 1 capsule (100 mg) by mouth 2 times daily for 10 days  Dispense: 20 capsule; Refill: 0    2. Multiple joint pain  Patient has had multiple joint pain in knees, wrists, elbows and hips over the last couple of months. Patient comes from [facility] where Lymes is prevalent. Has had ticks on herself, does not remember a bullseye rash, but does react to them pretty significantly. Has had no change with activity to give her joint pain. Has a family history of RA  Will get some lab work to rule out underlying inflammatory process  - Lyme Disease [place] with reflex to WB Serum  - Rheumatoid factor  - ESR: Erythrocyte sedimentation rate  - CRP, inflammation    Patient Instructions   Start antibiotics for 10 days   Make sure you are getting a lot of rest and fluids  Ibuprofen and/or tylenol for discomfort or fever  Warm salt water gargles 3-4 times a day for sore throat   Cool mist vaporizer at night   If you can tolerate you can use a nasal saline flush such as the Oneida Pot  Sleep with head of bed up at night to promote drainage     Labs today for joint pain - will update you on results     Return to clinic if not seeing improvement in sinuses         Sinusitis (Antibiotic Treatment)    The sinuses are  air-filled spaces within the bones of the face. They connect to the inside of the nose. Sinusitis is an inflammation of the tissue lining the sinus cavity. Sinus inflammation can occur during a cold. It can also be due to allergies to pollens and other particles in the air. Sinusitis can cause symptoms of sinus congestion and fullness. A sinus infection causes fever, headache and facial pain. There is often green or yellow drainage from the nose or into the back of the throat (post-nasal drip). You have been given antibiotics to treat this condition.  Home care:    Take the full course of antibiotics as instructed. Do not stop taking them, even if you feel better.    Drink plenty of water, hot tea, and other liquids. This may help thin mucus. It also may promote sinus drainage.    Heat may help soothe painful areas of the face. Use a towel soaked in hot water. Or,  the shower and direct the hot spray onto your face. Using a vaporizer along with a menthol rub at night may also help.     An expectorant containing guaifenesin may help thin the mucus and promote drainage from the sinuses.    Over-the-counter decongestants may be used unless a similar medicine was prescribed. Nasal sprays work the fastest. Use one that contains phenylephrine or oxymetazoline. First blow the nose gently. Then use the spray. Do not use these medicines more often than directed on the label or symptoms may get worse. You may also use tablets containing pseudoephedrine. Avoid products that combine ingredients, because side effects may be increased. Read labels. You can also ask the pharmacist for help. (NOTE: Persons with high blood pressure should not use decongestants. They can raise blood pressure.)    Over-the-counter antihistamines may help if allergies contributed to your sinusitis.      Do not use nasal rinses or irrigation during an acute sinus infection, unless told to by your health care provider. Rinsing may spread the  infection to other sinuses.    Use acetaminophen or ibuprofen to control pain, unless another pain medicine was prescribed. (If you have chronic liver or kidney disease or ever had a stomach ulcer, talk with your doctor before using these medicines. Aspirin should never be used in anyone under 18 years of age who is ill with a fever. It may cause severe liver damage.)    Don't smoke. This can worsen symptoms.  Follow-up care  Follow up with your healthcare provider or our staff if you are not improving within the next week.  When to seek medical advice  Call your healthcare provider if any of these occur:    Facial pain or headache becoming more severe    Stiff neck    Unusual drowsiness or confusion    Swelling of the forehead or eyelids    Vision problems, including blurred or double vision    Fever of 100.4 F (38 C) or higher, or as directed by your healthcare provider    Seizure    Breathing problems    Symptoms not resolving within 10 days  Date Last Reviewed: 4/13/2015 2000-2017 [employer]. [address] [place] [address], [place] [address]. All rights reserved. This information is not intended as a substitute for professional medical care. Always follow your healthcare professional's instructions.            SHRUTHI Giles [facility]

## 2017-11-08 NOTE — LETTER
My Depression Action Plan  Name: Alma Mae   Date of Birth 1980  Date: 11/8/2017    My doctor: Daniela Maldonado   My clinic: [address] [id number] [phone]          GREEN    ZONE   Good Control    What it looks like:     Things are going generally well. You have normal up s and down s. You may even feel depressed from time to time, but bad moods usually last less than a day.   What you need to do:  1. Continue to care for yourself (see self care plan)  2. Check your depression survival kit and update it as needed  3. Follow your physician s recommendations including any medication.  4. Do not stop taking medication unless you consult with your physician first.           YELLOW         ZONE Getting Worse    What it looks like:     Depression is starting to interfere with your life.     It may be hard to get out of bed; you may be starting to isolate yourself from others.    Symptoms of depression are starting to last most all day and this has happened for several days.     You may have suicidal thoughts but they are not constant.   What you need to do:     1. Call your care team, your response to treatment will improve if you keep your care team informed of your progress. Yellow periods are signs an adjustment may need to be made.     2. Continue your self-care, even if you have to fake it!    3. Talk to someone in your support network    4. Open up your depression survival kit           RED    ZONE Medical Alert - Get Help    What it looks like:     Depression is seriously interfering with your life.     You may experience these or other symptoms: You can t get out of bed most days, can t work or engage in other necessary activities, you have trouble taking care of basic hygiene, or basic responsibilities, thoughts of suicide or death that will not go away, self-injurious behavior.     What you need to do:  1. Call your care team  and request a same-day appointment. If they are not available (weekends or after hours) call your local crisis line, emergency room or 911.      Electronically signed by: Liset Padilla, November 8, 2017    Depression Self Care Plan / Survival Kit    Self-Care for Depression  Here s the deal. Your body and mind are really not as separate as most people think.  What you do and think affects how you feel and how you feel influences what you do and think. This means if you do things that people who feel good do, it will help you feel better.  Sometimes this is all it takes.  There is also a place for medication and therapy depending on how severe your depression is, so be sure to consult with your medical provider and/ or Behavioral Health Consultant if your symptoms are worsening or not improving.     In order to better manage my stress, I will:    Exercise  Get some form of exercise, every day. This will help reduce pain and release endorphins, the  feel good  chemicals in your brain. This is almost as good as taking antidepressants!  This is not the same as joining a gym and then never going! (they count on that by the way ) It can be as simple as just going for a walk or doing some gardening, anything that will get you moving.      Hygiene   Maintain good hygiene (Get out of bed in the morning, Make your bed, Brush your teeth, Take a shower, and Get dressed like you were going to work, even if you are unemployed).  If your clothes don't fit try to get ones that do.    Diet  I will strive to eat foods that are good for me, drink plenty of water, and avoid excessive sugar, caffeine, alcohol, and other mood-altering substances.  Some foods that are helpful in depression are: complex carbohydrates, B vitamins, flaxseed, fish or fish oil, fresh fruits and vegetables.    Psychotherapy  I agree to participate in Individual Therapy (if recommended).    Medication  If prescribed medications, I agree to take them.  Missing  doses can result in serious side effects.  I understand that drinking alcohol, or other illicit drug use, may cause potential side effects.  I will not stop my medication abruptly without first discussing it with my provider.    Staying Connected With Others  I will stay in touch with my friends, family members, and my primary care provider/team.    Use your imagination  Be creative.  We all have a creative side; it doesn t matter if it s oil painting, sand castles, or mud pies! This will also kick up the endorphins.    Witness Beauty  (AKA stop and smell the roses) Take a look outside, even in mid-winter. Notice colors, textures. Watch the squirrels and birds.     Service to others  Be of service to others.  There is always someone else in need.  By helping others we can  get out of ourselves  and remember the really important things.  This also provides opportunities for practicing all the other parts of the program.    Humor  Laugh and be silly!  Adjust your TV habits for less news and crime-drama and more comedy.    Control your stress  Try breathing deep, massage therapy, biofeedback, and meditation. Find time to relax each day.     My support system    Clinic Contact:  Phone number:    Contact 1:  Phone number:    Contact 2:  Phone number:    Nondenominational/:  Phone number:    Therapist:  Phone number:    Local crisis center:    Phone number:    Other community support:  Phone number:

## 2017-11-08 NOTE — MR AVS SNAPSHOT
After Visit Summary   11/8/2017    Alma Mae    MRN: [id number]           Patient Information     Date Of Birth          1980        Visit Information        Provider Department      11/8/2017 9:20 AM Alma Coronel APRN [facility]        Today's Diagnoses     Acute sinusitis with symptoms > 10 days    -  1    Multiple joint pain          Care Instructions    Start antibiotics for 10 days   Make sure you are getting a lot of rest and fluids  Ibuprofen and/or tylenol for discomfort or fever  Warm salt water gargles 3-4 times a day for sore throat   Cool mist vaporizer at night   If you can tolerate you can use a nasal saline flush such as the [place] Pot  Sleep with head of bed up at night to promote drainage     Labs today for joint pain - will update you on results     Return to clinic if not seeing improvement in sinuses         Sinusitis (Antibiotic Treatment)    The sinuses are air-filled spaces within the bones of the face. They connect to the inside of the nose. Sinusitis is an inflammation of the tissue lining the sinus cavity. Sinus inflammation can occur during a cold. It can also be due to allergies to pollens and other particles in the air. Sinusitis can cause symptoms of sinus congestion and fullness. A sinus infection causes fever, headache and facial pain. There is often green or yellow drainage from the nose or into the back of the throat (post-nasal drip). You have been given antibiotics to treat this condition.  Home care:    Take the full course of antibiotics as instructed. Do not stop taking them, even if you feel better.    Drink plenty of water, hot tea, and other liquids. This may help thin mucus. It also may promote sinus drainage.    Heat may help soothe painful areas of the face. Use a towel soaked in hot water. Or,  the shower and direct the hot spray onto your face. Using a vaporizer along with a menthol rub at night may also  help.     An expectorant containing guaifenesin may help thin the mucus and promote drainage from the sinuses.    Over-the-counter decongestants may be used unless a similar medicine was prescribed. Nasal sprays work the fastest. Use one that contains phenylephrine or oxymetazoline. First blow the nose gently. Then use the spray. Do not use these medicines more often than directed on the label or symptoms may get worse. You may also use tablets containing pseudoephedrine. Avoid products that combine ingredients, because side effects may be increased. Read labels. You can also ask the pharmacist for help. (NOTE: Persons with high blood pressure should not use decongestants. They can raise blood pressure.)    Over-the-counter antihistamines may help if allergies contributed to your sinusitis.      Do not use nasal rinses or irrigation during an acute sinus infection, unless told to by your health care provider. Rinsing may spread the infection to other sinuses.    Use acetaminophen or ibuprofen to control pain, unless another pain medicine was prescribed. (If you have chronic liver or kidney disease or ever had a stomach ulcer, talk with your doctor before using these medicines. Aspirin should never be used in anyone under 18 years of age who is ill with a fever. It may cause severe liver damage.)    Don't smoke. This can worsen symptoms.  Follow-up care  Follow up with your healthcare provider or our staff if you are not improving within the next week.  When to seek medical advice  Call your healthcare provider if any of these occur:    Facial pain or headache becoming more severe    Stiff neck    Unusual drowsiness or confusion    Swelling of the forehead or eyelids    Vision problems, including blurred or double vision    Fever of 100.4 F (38 C) or higher, or as directed by your healthcare provider    Seizure    Breathing problems    Symptoms not resolving within 10 days  Date Last Reviewed: 4/13/2015 2000-2017  "[employer]. [address] [place] [address], [place] [address]. All rights reserved. This information is not intended as a substitute for professional medical care. Always follow your healthcare professional's instructions.                Follow-ups after your visit        Who to contact     If you have questions or need follow up information about today's clinic visit or your schedule please contact [facility] directly at [phone].  Normal or non-critical lab and imaging results will be communicated to you by [employer], letter or phone within 4 business days after the clinic has received the results. If you do not hear from us within 7 days, please contact the clinic through [employer] or phone. If you have a critical or abnormal lab result, we will notify you by phone as soon as possible.  Submit refill requests through [employer] or call your pharmacy and they will forward the refill request to us. Please allow 3 business days for your refill to be completed.          Additional Information About Your Visit        [employer] Information     [employer] gives you secure access to your electronic health record. If you see a primary care provider, you can also send messages to your care team and make appointments. If you have questions, please call your primary care clinic.  If you do not have a primary care provider, please call [phone] and they will assist you.        Care EveryWhere ID     This is your Care EveryWhere ID. This could be used by other organizations to access your [place] medical records  [id number]        Your Vitals Were     Pulse Temperature Respirations Height Pulse Oximetry BMI (Body Mass Index)    110 98.4  F (36.9  C) (Tympanic) 16 5' 2.25\" (1.581 m) 97% 39.01 kg/m2       Blood Pressure from Last 3 Encounters:   11/08/17 122/80   09/26/17 131/74   07/20/17 (!) 134/92    Weight from Last 3 Encounters:   11/08/17 215 lb (97.5 kg)   07/20/17 220 lb (99.8 kg)              We " Performed the Following     CRP, inflammation     DEPRESSION ACTION PLAN (DAP)     ESR: Erythrocyte sedimentation rate     Lyme Disease Verónica with reflex to WB Serum     Rheumatoid factor          Today's Medication Changes          These changes are accurate as of: 11/8/17 10:13 AM.  If you have any questions, ask your nurse or doctor.               Start taking these medicines.        Dose/Directions    doxycycline 100 MG capsule   Commonly known as:  VIBRAMYCIN   Used for:  Acute sinusitis with symptoms > 10 days   Started by:  Alma Coronel APRN CNP        Dose:  100 mg   Take 1 capsule (100 mg) by mouth 2 times daily for 10 days   Quantity:  20 capsule   Refills:  0            Where to get your medicines      These medications were sent to [facility] [place] [id number]     Phone:  [phone]     doxycycline 100 MG capsule                Primary Care Provider Office Phone # Fax #    Daniela Belkis SHRUTHI Maldonado [phone] [address] [place] [address]        Equal Access to Services     FRANCIA WELCH : Hadii analia ku hadasho Soomaali, waaxda luqadaha, qaybta kaalmada adeegyada, waxay romanain haypeggy silva . So [facility] [phone].    ATENCIÓN: Si habla español, tiene a graham disposición servicios gratuitos de asistencia lingüística. Llame al [phone].    We comply with applicable federal civil rights laws and [place] laws. We do not discriminate on the basis of race, color, national origin, age, disability, sex, sexual orientation, or gender identity.            Thank you!     Thank you for choosing [facility]  for your care. Our goal is always to provide you with excellent care. Hearing back from our patients is one way we can continue to improve our services. Please take a few minutes to complete the written survey that you may receive in the mail after your visit  with us. Thank you!             Your Updated Medication List - Protect others around you: Learn how to safely use, store and throw away your medicines at www.disposemymeds.org.          This list is accurate as of: 11/8/17 10:13 AM.  Always use your most recent med list.                   Brand Name Dispense Instructions for use Diagnosis    amphetamine-dextroamphetamine 20 MG per tablet    ADDERALL    60 tablet    TK ONE T PO BID    ADHD (attention deficit hyperactivity disorder), inattentive type       doxycycline 100 MG capsule    VIBRAMYCIN    20 capsule    Take 1 capsule (100 mg) by mouth 2 times daily for 10 days    Acute sinusitis with symptoms > 10 days       hydrochlorothiazide 12.5 MG Tabs tablet     90 tablet    Take 1 tablet (12.5 mg) by mouth daily    Benign essential hypertension       lisinopril 10 MG tablet    PRINIVIL/ZESTRIL    90 tablet    Take 1 tablet (10 mg) by mouth daily    Benign essential hypertension       metFORMIN 500 MG tablet    GLUCOPHAGE    180 tablet    Take 1 tablet (500 mg) by mouth 2 times daily (with meals)    Pre-diabetes       ondansetron 4 MG tablet    ZOFRAN    30 tablet    Take 1 tablet (4 mg) by mouth every 6 hours as needed for nausea    Nausea       PARoxetine 20 MG tablet    PAXIL    30 tablet    Take 1 tablet (20 mg) by mouth every morning    Mild episode of recurrent major depressive disorder (H)       traZODone 50 MG tablet    DESYREL     TK 1/2 TO 1 T PO QHS PRN        varenicline 1 MG tablet    CHANTIX    60 tablet    Take 1 tablet (1 mg) by mouth 2 times daily    Encounter for smoking cessation counseling

## 2017-11-09 LAB — B BURGDOR IGG+IGM SER QL: 0.09 (ref 0–0.89)

## 2017-11-09 ASSESSMENT — ANXIETY QUESTIONNAIRES: GAD7 TOTAL SCORE: 5

## 2017-11-10 LAB — RHEUMATOID FACT SER NEPH-ACNC: <20 IU/ML (ref 0–20)

## 2017-11-29 DIAGNOSIS — F33.0 MILD EPISODE OF RECURRENT MAJOR DEPRESSIVE DISORDER (H): ICD-10-CM

## 2017-11-29 RX ORDER — PAROXETINE 20 MG/1
TABLET, FILM COATED ORAL
Qty: 30 TABLET | Refills: 0 | Status: SHIPPED | OUTPATIENT
Start: 2017-11-29 | End: 2017-12-30

## 2017-12-04 ENCOUNTER — MYC REFILL (OUTPATIENT)
Dept: FAMILY MEDICINE | Facility: CLINIC | Age: 37
End: 2017-12-04

## 2017-12-04 DIAGNOSIS — F90.0 ADHD (ATTENTION DEFICIT HYPERACTIVITY DISORDER), INATTENTIVE TYPE: ICD-10-CM

## 2017-12-04 NOTE — TELEPHONE ENCOUNTER
Message from [employer]:  Original authorizing provider: Ashish Saab MD    Alma Mae would like a refill of the following medications:  amphetamine-dextroamphetamine (ADDERALL) 20 MG per tablet [Ashish Saab MD]    Preferred pharmacy: [facility] [address] [place] [address] AT [facility] OF [address] [place] [address]    Comment:  Can you please let me know when I can come and pick this up, I have to fill it at [facility] pharmacy. Thank you. Kati [phone]

## 2017-12-05 RX ORDER — DEXTROAMPHETAMINE SACCHARATE, AMPHETAMINE ASPARTATE, DEXTROAMPHETAMINE SULFATE AND AMPHETAMINE SULFATE 5; 5; 5; 5 MG/1; MG/1; MG/1; MG/1
TABLET ORAL
Qty: 60 TABLET | Refills: 0 | Status: SHIPPED | OUTPATIENT
Start: 2017-12-05 | End: 2018-01-04

## 2017-12-07 ENCOUNTER — TELEPHONE (OUTPATIENT)
Dept: FAMILY MEDICINE | Facility: CLINIC | Age: 37
End: 2017-12-07

## 2017-12-07 NOTE — TELEPHONE ENCOUNTER
PA request called into [employer].      P/A HAS BEEN APPROVED. PHARMACY NOTIFIED VIA FAX AND WILL CALL PT. WHEN RX IS READY.  For 1 year.  Case ID [id number]

## 2017-12-30 DIAGNOSIS — F33.0 MILD EPISODE OF RECURRENT MAJOR DEPRESSIVE DISORDER (H): ICD-10-CM

## 2017-12-30 NOTE — TELEPHONE ENCOUNTER
Requested Prescriptions   Pending Prescriptions Disp Refills     PARoxetine (PAXIL) 20 MG tablet   Last Written Prescription Date:  11/29/17  Last Fill Quantity: 30,  # refills: 0   Last Office Visit with [facility], [facility] or [facility] prescribing provider:  7/20/17   Future Office Visit:      30 tablet 0     Sig: TAKE 1 TABLET(20 MG) BY MOUTH EVERY MORNING    SSRIs Protocol Passed    12/30/2017  3:37 AM       Passed - PHQ-9 score less than 5 in past 6 months    The PHQ-9 criteria is meant to fail. It requires a PHQ-9 score review  PHQ-9 SCORE 11/8/2017   Total Score 3     RAMANA-7 SCORE 11/8/2017   Total Score 5                Passed - Patient is age 18 or older       Passed - No active pregnancy on record       Passed - No positive pregnancy test in last 12 months       Passed - Recent (6 mo) or future visit with authorizing provider's specialty    Patient had office visit in the last 6 months or has a visit in the next 30 days with authorizing provider.  See chart review.

## 2018-01-02 RX ORDER — PAROXETINE 20 MG/1
TABLET, FILM COATED ORAL
Qty: 90 TABLET | Refills: 1 | Status: SHIPPED | OUTPATIENT
Start: 2018-01-02 | End: 2018-07-16

## 2018-01-04 ENCOUNTER — MYC REFILL (OUTPATIENT)
Dept: FAMILY MEDICINE | Facility: CLINIC | Age: 38
End: 2018-01-04

## 2018-01-04 DIAGNOSIS — F90.0 ADHD (ATTENTION DEFICIT HYPERACTIVITY DISORDER), INATTENTIVE TYPE: ICD-10-CM

## 2018-01-05 RX ORDER — DEXTROAMPHETAMINE SACCHARATE, AMPHETAMINE ASPARTATE, DEXTROAMPHETAMINE SULFATE AND AMPHETAMINE SULFATE 5; 5; 5; 5 MG/1; MG/1; MG/1; MG/1
20 TABLET ORAL 2 TIMES DAILY
Qty: 60 TABLET | Refills: 0 | Status: SHIPPED | OUTPATIENT
Start: 2018-01-05 | End: 2018-05-14

## 2018-01-05 NOTE — TELEPHONE ENCOUNTER
Message from MyChart:  Original authorizing provider: MD Porsche Markricia Tu would like a refill of the following medications:  amphetamine-dextroamphetamine (ADDERALL) 20 MG per tablet [Emily Powell MD]    Preferred pharmacy: [facility] [address] [place] [address] AVE AT Health system OF [address] [place] [address]    Comment:  Can you please call me when this is available for me to ? Thanks

## 2018-01-21 ENCOUNTER — HEALTH MAINTENANCE LETTER (OUTPATIENT)
Age: 38
End: 2018-01-21

## 2018-01-31 DIAGNOSIS — R73.03 PRE-DIABETES: ICD-10-CM

## 2018-01-31 NOTE — TELEPHONE ENCOUNTER
"Requested Prescriptions   Pending Prescriptions Disp Refills     metFORMIN (GLUCOPHAGE) 500 MG tablet [Pharmacy Med Name: METFORMIN 500MG TABLETS] 180 tablet 0     Sig: TAKE 1 TABLET(500 MG) BY MOUTH TWICE DAILY WITH MEALS    Biguanide Agents Failed    1/31/2018  3:38 AM       Failed - Patient has had a Microalbumin in the past 12 mos.    No lab results found.         Failed - Patient has documented A1c within the specified period of time.    Recent Labs   Lab Test  07/20/17   1233   A1C  5.9            Passed - Patient's BP is less than 140/90    BP Readings from Last 3 Encounters:   11/08/17 122/80   09/26/17 131/74   07/20/17 (!) 134/92                Passed - Patient has documented LDL within the past 12 mos.    Recent Labs   Lab Test  09/26/17   0840   LDL  114*            Passed - Patient is age 10 or older       Passed - Patient's CR is NOT>1.4 OR Patient's EGFR is NOT<45 within past 12 mos.    Recent Labs   Lab Test  09/26/17   0840   GFRESTIMATED  >90   GFRESTBLACK  >90       Recent Labs   Lab Test  09/26/17   0840   CR  0.66            Passed - Patient does NOT have a diagnosis of CHF.       Passed - Patient is not pregnant       Passed - Patient has not had a positive pregnancy test within the past 12 mos.        Passed - Recent (6 mos) or future visit with authorizing provider's specialty    Patient had office visit in the last 6 months or has a visit in the next 30 days with authorizing provider.  See \"Patient Info\" tab in inbasket, or \"Choose Columns\" in Meds & Orders section of the refill encounter.      Last Written Prescription Date:  10/16/17  Last Fill Quantity: 180,  # refills: 0   Last Office Visit with [facility] [place] [place] provider:  11/8/17   Future Office Visit:                 "

## 2018-02-01 DIAGNOSIS — I10 BENIGN ESSENTIAL HYPERTENSION: ICD-10-CM

## 2018-02-01 RX ORDER — LISINOPRIL 10 MG/1
10 TABLET ORAL DAILY
Qty: 90 TABLET | Refills: 1 | Status: CANCELLED | OUTPATIENT
Start: 2018-02-01

## 2018-02-09 ENCOUNTER — OFFICE VISIT (OUTPATIENT)
Dept: FAMILY MEDICINE | Facility: CLINIC | Age: 38
End: 2018-02-09
Payer: COMMERCIAL

## 2018-02-09 VITALS
TEMPERATURE: 96.6 F | DIASTOLIC BLOOD PRESSURE: 72 MMHG | HEART RATE: 68 BPM | SYSTOLIC BLOOD PRESSURE: 124 MMHG | WEIGHT: 215.6 LBS | BODY MASS INDEX: 39.12 KG/M2 | RESPIRATION RATE: 14 BRPM

## 2018-02-09 DIAGNOSIS — Z71.6 ENCOUNTER FOR SMOKING CESSATION COUNSELING: ICD-10-CM

## 2018-02-09 DIAGNOSIS — F90.0 ADHD (ATTENTION DEFICIT HYPERACTIVITY DISORDER), INATTENTIVE TYPE: ICD-10-CM

## 2018-02-09 DIAGNOSIS — R73.03 PRE-DIABETES: Primary | ICD-10-CM

## 2018-02-09 DIAGNOSIS — R11.0 NAUSEA: ICD-10-CM

## 2018-02-09 DIAGNOSIS — J32.0 CHRONIC MAXILLARY SINUSITIS: ICD-10-CM

## 2018-02-09 PROCEDURE — 99214 OFFICE O/P EST MOD 30 MIN: CPT | Performed by: NURSE PRACTITIONER

## 2018-02-09 RX ORDER — HYDROCHLOROTHIAZIDE 12.5 MG/1
12.5 TABLET ORAL DAILY
Qty: 90 TABLET | Refills: 1 | Status: CANCELLED | OUTPATIENT
Start: 2018-02-09

## 2018-02-09 RX ORDER — ONDANSETRON 4 MG/1
4 TABLET, FILM COATED ORAL EVERY 6 HOURS PRN
Qty: 30 TABLET | Refills: 3 | Status: SHIPPED | OUTPATIENT
Start: 2018-02-09 | End: 2018-07-10

## 2018-02-09 RX ORDER — DEXTROAMPHETAMINE SACCHARATE, AMPHETAMINE ASPARTATE, DEXTROAMPHETAMINE SULFATE AND AMPHETAMINE SULFATE 5; 5; 5; 5 MG/1; MG/1; MG/1; MG/1
20 TABLET ORAL 2 TIMES DAILY
Qty: 60 TABLET | Refills: 0 | Status: SHIPPED | OUTPATIENT
Start: 2018-03-12 | End: 2018-04-11

## 2018-02-09 RX ORDER — DEXTROAMPHETAMINE SACCHARATE, AMPHETAMINE ASPARTATE, DEXTROAMPHETAMINE SULFATE AND AMPHETAMINE SULFATE 5; 5; 5; 5 MG/1; MG/1; MG/1; MG/1
20 TABLET ORAL 2 TIMES DAILY
Qty: 60 TABLET | Refills: 0 | Status: SHIPPED | OUTPATIENT
Start: 2018-02-09 | End: 2018-03-11

## 2018-02-09 RX ORDER — DEXTROAMPHETAMINE SACCHARATE, AMPHETAMINE ASPARTATE, DEXTROAMPHETAMINE SULFATE AND AMPHETAMINE SULFATE 5; 5; 5; 5 MG/1; MG/1; MG/1; MG/1
20 TABLET ORAL 2 TIMES DAILY
Qty: 60 TABLET | Refills: 0 | Status: CANCELLED | OUTPATIENT
Start: 2018-02-09

## 2018-02-09 RX ORDER — PAROXETINE 20 MG/1
TABLET, FILM COATED ORAL
Qty: 90 TABLET | Refills: 1 | Status: CANCELLED | OUTPATIENT
Start: 2018-02-09

## 2018-02-09 RX ORDER — DEXTROAMPHETAMINE SACCHARATE, AMPHETAMINE ASPARTATE, DEXTROAMPHETAMINE SULFATE AND AMPHETAMINE SULFATE 5; 5; 5; 5 MG/1; MG/1; MG/1; MG/1
20 TABLET ORAL 2 TIMES DAILY
Qty: 60 TABLET | Refills: 0 | Status: SHIPPED | OUTPATIENT
Start: 2018-04-12 | End: 2018-05-12

## 2018-02-09 RX ORDER — LISINOPRIL 10 MG/1
10 TABLET ORAL DAILY
Qty: 90 TABLET | Refills: 1 | Status: CANCELLED | OUTPATIENT
Start: 2018-02-09

## 2018-02-09 RX ORDER — VARENICLINE TARTRATE 1 MG/1
1 TABLET, FILM COATED ORAL 2 TIMES DAILY
Qty: 60 TABLET | Refills: 3 | Status: SHIPPED | OUTPATIENT
Start: 2018-02-09 | End: 2018-09-19

## 2018-02-09 NOTE — MR AVS SNAPSHOT
After Visit Summary   2/9/2018    Alma Mae    MRN: [id number]           Patient Information     Date Of Birth          1980        Visit Information        Provider Department      2/9/2018 1:40 PM Daniela Maldonado APRN CNP [facility]        Today's Diagnoses     Chronic maxillary sinusitis    -  1    Pre-diabetes        ADHD (attention deficit hyperactivity disorder), inattentive type        Mild episode of recurrent major depressive disorder (H)        Nausea        Encounter for smoking cessation counseling          Care Instructions    Medications refilled    ENT referral made            Follow-ups after your visit        Additional Services     OTOLARYNGOLOGY REFERRAL       Your provider has referred you to: [facility]: [facility] [phone]   [email or web address]    Please be aware that coverage of these services is subject to the terms and limitations of your health insurance plan.  Call member services at your health plan with any benefit or coverage questions.      Please bring the following with you to your appointment:    (1) Any X-Rays, CTs or MRIs which have been performed.  Contact the facility where they were done to arrange for  prior to your scheduled appointment.   (2) List of current medications  (3) This referral request   (4) Any documents/labs given to you for this referral                  Who to contact     If you have questions or need follow up information about today's clinic visit or your schedule please contact [facility] directly at [phone].  Normal or non-critical lab and imaging results will be communicated to you by MyChart, letter or phone within 4 business days after the clinic has received the results. If you do not hear from us within 7 days, please contact the clinic through MyChart or phone. If you have a critical or abnormal lab result, we will notify you by  phone as soon as possible.  Submit refill requests through [employer] or call your pharmacy and they will forward the refill request to us. Please allow 3 business days for your refill to be completed.          Additional Information About Your Visit        [employer] Information     [employer] gives you secure access to your electronic health record. If you see a primary care provider, you can also send messages to your care team and make appointments. If you have questions, please call your primary care clinic.  If you do not have a primary care provider, please call [phone] and they will assist you.        Care EveryWhere ID     This is your Care EveryWhere ID. This could be used by other organizations to access your [place] medical records  [id number]        Your Vitals Were     Pulse Temperature Respirations BMI (Body Mass Index)          68 96.6  F (35.9  C) (Tympanic) 14 39.12 kg/m2         Blood Pressure from Last 3 Encounters:   02/09/18 124/72   11/08/17 122/80   09/26/17 131/74    Weight from Last 3 Encounters:   02/09/18 215 lb 9.6 oz (97.8 kg)   11/08/17 215 lb (97.5 kg)   07/20/17 220 lb (99.8 kg)              We Performed the Following     OTOLARYNGOLOGY REFERRAL          Today's Medication Changes          These changes are accurate as of 2/9/18  2:32 PM.  If you have any questions, ask your nurse or doctor.               These medicines have changed or have updated prescriptions.        Dose/Directions    * amphetamine-dextroamphetamine 20 MG per tablet   Commonly known as:  ADDERALL   This may have changed:  Another medication with the same name was added. Make sure you understand how and when to take each.   Used for:  ADHD (attention deficit hyperactivity disorder), inattentive type   Changed by:  Daniela Maldonado APRN CNP        Dose:  20 mg   Take 1 tablet (20 mg) by mouth 2 times daily Due for an appointment for additional refills   Quantity:  60 tablet   Refills:  0       *  amphetamine-dextroamphetamine 20 MG per tablet   Commonly known as:  ADDERALL   This may have changed:  You were already taking a medication with the same name, and this prescription was added. Make sure you understand how and when to take each.   Used for:  ADHD (attention deficit hyperactivity disorder), inattentive type   Changed by:  Daniela Maldonado APRN CNP        Dose:  20 mg   Take 1 tablet (20 mg) by mouth 2 times daily   Quantity:  60 tablet   Refills:  0       * amphetamine-dextroamphetamine 20 MG per tablet   Commonly known as:  ADDERALL   This may have changed:  You were already taking a medication with the same name, and this prescription was added. Make sure you understand how and when to take each.   Used for:  ADHD (attention deficit hyperactivity disorder), inattentive type   Changed by:  Daniela Maldonado APRN CNP        Dose:  20 mg   Start taking on:  3/12/2018   Take 1 tablet (20 mg) by mouth 2 times daily   Quantity:  60 tablet   Refills:  0       * amphetamine-dextroamphetamine 20 MG per tablet   Commonly known as:  ADDERALL   This may have changed:  You were already taking a medication with the same name, and this prescription was added. Make sure you understand how and when to take each.   Used for:  ADHD (attention deficit hyperactivity disorder), inattentive type   Changed by:  Daniela Maldonado APRN CNP        Dose:  20 mg   Start taking on:  4/12/2018   Take 1 tablet (20 mg) by mouth 2 times daily   Quantity:  60 tablet   Refills:  0       metFORMIN 500 MG tablet   Commonly known as:  GLUCOPHAGE   This may have changed:  See the new instructions.   Used for:  Pre-diabetes   Changed by:  Daniela Maldonado APRN CNP        TAKE 1 TABLET(500 MG) BY MOUTH TWICE DAILY WITH MEALS   Quantity:  180 tablet   Refills:  0       * Notice:  This list has 4 medication(s) that are the same as other medications prescribed for you. Read the directions carefully, and ask your doctor  or other care provider to review them with you.         Where to get your medicines      These medications were sent to [id number] - [facility] [address] AT [facility] OF [address]  [address] [facility] [address]     Phone:  [phone]     metFORMIN 500 MG tablet    ondansetron 4 MG tablet    varenicline 1 MG tablet         Some of these will need a paper prescription and others can be bought over the counter.  Ask your nurse if you have questions.     Bring a paper prescription for each of these medications     amphetamine-dextroamphetamine 20 MG per tablet    amphetamine-dextroamphetamine 20 MG per tablet    amphetamine-dextroamphetamine 20 MG per tablet                Primary Care Provider Office Phone # Fax #    Daniela Tamayo SHRUTHI Maldonado [phone] [address] [facility] [address]        Equal Access to Services     FRANCIA WELCH : Hadii aad ku hadasho Sojeremy, waaxda luqadaha, qaybta kaalmada adefrankyyada, shlomo silva . So [facility] [phone].    ATENCIÓN: Si habla español, tiene a graham disposición servicios gratuitos de asistencia lingüística. Ortiz al [phone].    We comply with applicable federal civil rights laws and [place] laws. We do not discriminate on the basis of race, color, national origin, age, disability, sex, sexual orientation, or gender identity.            Thank you!     Thank you for choosing [facility]  for your care. Our goal is always to provide you with excellent care. Hearing back from our patients is one way we can continue to improve our services. Please take a few minutes to complete the written survey that you may receive in the mail after your visit with us. Thank you!             Your Updated Medication List - Protect others around you: Learn how to safely use, store and throw away your medicines at www.disposemymeds.org.          This list is accurate as  of 2/9/18  2:32 PM.  Always use your most recent med list.                   Brand Name Dispense Instructions for use Diagnosis    * amphetamine-dextroamphetamine 20 MG per tablet    ADDERALL    60 tablet    Take 1 tablet (20 mg) by mouth 2 times daily Due for an appointment for additional refills    ADHD (attention deficit hyperactivity disorder), inattentive type       * amphetamine-dextroamphetamine 20 MG per tablet    ADDERALL    60 tablet    Take 1 tablet (20 mg) by mouth 2 times daily    ADHD (attention deficit hyperactivity disorder), inattentive type       * amphetamine-dextroamphetamine 20 MG per tablet   Start taking on:  3/12/2018    ADDERALL    60 tablet    Take 1 tablet (20 mg) by mouth 2 times daily    ADHD (attention deficit hyperactivity disorder), inattentive type       * amphetamine-dextroamphetamine 20 MG per tablet   Start taking on:  4/12/2018    ADDERALL    60 tablet    Take 1 tablet (20 mg) by mouth 2 times daily    ADHD (attention deficit hyperactivity disorder), inattentive type       hydrochlorothiazide 12.5 MG Tabs tablet     90 tablet    Take 1 tablet (12.5 mg) by mouth daily    Benign essential hypertension       lisinopril 10 MG tablet    PRINIVIL/ZESTRIL    90 tablet    Take 1 tablet (10 mg) by mouth daily    Benign essential hypertension       metFORMIN 500 MG tablet    GLUCOPHAGE    180 tablet    TAKE 1 TABLET(500 MG) BY MOUTH TWICE DAILY WITH MEALS    Pre-diabetes       ondansetron 4 MG tablet    ZOFRAN    30 tablet    Take 1 tablet (4 mg) by mouth every 6 hours as needed for nausea    Nausea       PARoxetine 20 MG tablet    PAXIL    90 tablet    TAKE 1 TABLET(20 MG) BY MOUTH EVERY MORNING    Mild episode of recurrent major depressive disorder (H)       traZODone 50 MG tablet    DESYREL     TK 1/2 TO 1 T PO QHS PRN        varenicline 1 MG tablet    CHANTIX    60 tablet    Take 1 tablet (1 mg) by mouth 2 times daily    Encounter for smoking cessation counseling       * Notice:  This  list has 4 medication(s) that are the same as other medications prescribed for you. Read the directions carefully, and ask your doctor or other care provider to review them with you.

## 2018-02-09 NOTE — NURSING NOTE
"Chief Complaint   Patient presents with     Diabetes     Hypertension     A.D.H.D       Initial /72 (BP Location: Right arm, Cuff Size: Adult Regular)  Pulse 68  Temp 96.6  F (35.9  C) (Tympanic)  Resp 14  Wt 215 lb 9.6 oz (97.8 kg)  BMI 39.12 kg/m2 Estimated body mass index is 39.12 kg/(m^2) as calculated from the following:    Height as of 11/8/17: 5' 2.25\" (1.581 m).    Weight as of this encounter: 215 lb 9.6 oz (97.8 kg).      Health Maintenance that is potentially due pending provider review:  Pap Smear    Pt will schedule future appt.    Is there anyone who you would like to be able to receive your results? No  If yes have patient fill out EDWARD  Madelyn Suarez M.A.        "

## 2018-02-09 NOTE — PROGRESS NOTES
SUBJECTIVE:   Alma Mae is a 37 year old female who presents to clinic today for the following health issues:    Diabetes Follow-up      Patient is checking blood sugars: rarely.  Results are never above 90    Diabetic concerns: None     Symptoms of hypoglycemia (low blood sugar): none     Paresthesias (numbness or burning in feet) or sores: No     Date of last diabetic eye exam: Due, had one 9 months ago     BP Readings from Last 2 Encounters:   11/08/17 122/80   09/26/17 131/74     Hemoglobin A1C (%)   Date Value   07/20/2017 5.9     LDL Cholesterol Calculated (mg/dL)   Date Value   09/26/2017 114 (H)     Hypertension Follow-up      Outpatient blood pressures are being checked at home.  Results are 120-130's/70's.    Low Salt Diet: no added salt      Amount of exercise or physical activity: None    Problems taking medications regularly: No    Medication side effects: none    Diet: low salt      Medication Followup of Adderall     Taking Medication as prescribed: yes    Side Effects:  None    Medication Helping Symptoms:  yes     Takes nausea medication with Adderall as needed  Prescribed by psychiatrist     Chronic sinus symptoms since moving to [place]  Using medications and sinus flush to help with symptoms  Has been taking Chantix starter pack for smoking cessation-going well    Problem list and histories reviewed & adjusted, as indicated.  Additional history: as documented    Patient Active Problem List   Diagnosis     Morbid obesity (H)     Benign essential hypertension     Pre-diabetes     History reviewed. No pertinent surgical history.    Social History   Substance Use Topics     Smoking status: Former Smoker     Types: Cigarettes     Quit date: 7/8/2017     Smokeless tobacco: Never Used     Alcohol use No     Family History   Problem Relation Age of Onset     Hypertension Mother      Anxiety Disorder Mother      DIABETES Father      Depression Father      Anxiety Disorder Son      Depression Son           Current Outpatient Prescriptions   Medication Sig Dispense Refill     metFORMIN (GLUCOPHAGE) 500 MG tablet TAKE 1 TABLET(500 MG) BY MOUTH TWICE DAILY WITH MEALS 180 tablet 0     ondansetron (ZOFRAN) 4 MG tablet Take 1 tablet (4 mg) by mouth every 6 hours as needed for nausea 30 tablet 3     varenicline (CHANTIX) 1 MG tablet Take 1 tablet (1 mg) by mouth 2 times daily 60 tablet 3     amphetamine-dextroamphetamine (ADDERALL) 20 MG per tablet Take 1 tablet (20 mg) by mouth 2 times daily 60 tablet 0     [START ON 3/12/2018] amphetamine-dextroamphetamine (ADDERALL) 20 MG per tablet Take 1 tablet (20 mg) by mouth 2 times daily 60 tablet 0     [START ON 4/12/2018] amphetamine-dextroamphetamine (ADDERALL) 20 MG per tablet Take 1 tablet (20 mg) by mouth 2 times daily 60 tablet 0     hydrochlorothiazide 12.5 MG TABS tablet Take 1 tablet (12.5 mg) by mouth daily 90 tablet 1     lisinopril (PRINIVIL/ZESTRIL) 10 MG tablet Take 1 tablet (10 mg) by mouth daily 90 tablet 1     amphetamine-dextroamphetamine (ADDERALL) 20 MG per tablet Take 1 tablet (20 mg) by mouth 2 times daily Due for an appointment for additional refills 60 tablet 0     PARoxetine (PAXIL) 20 MG tablet TAKE 1 TABLET(20 MG) BY MOUTH EVERY MORNING 90 tablet 1     traZODone (DESYREL) 50 MG tablet TK 1/2 TO 1 T PO QHS PRN       Allergies   Allergen Reactions     Penicillins Rash     Sulfa Drugs Rash     Labs reviewed in EPIC    Reviewed and updated as needed this visit by clinical staff  Tobacco  Allergies  Meds  Med Hx  Surg Hx  Fam Hx  Soc Hx      Reviewed and updated as needed this visit by Provider         ROS:  Constitutional, HEENT, cardiovascular, pulmonary, gi and gu systems are negative, except as otherwise noted.    OBJECTIVE:     /72 (BP Location: Right arm, Cuff Size: Adult Regular)  Pulse 68  Temp 96.6  F (35.9  C) (Tympanic)  Resp 14  Wt 215 lb 9.6 oz (97.8 kg)  BMI 39.12 kg/m2  Body mass index is 39.12 kg/(m^2).  GENERAL:  healthy, alert and no distress  NECK: no adenopathy, no asymmetry, masses, or scars and thyroid normal to palpation  RESP: lungs clear to auscultation - no rales, rhonchi or wheezes  CV: regular rate and rhythm, normal S1 S2, no S3 or S4, no murmur, click or rub, no peripheral edema and peripheral pulses strong  ABDOMEN: soft, nontender, no hepatosplenomegaly, no masses and bowel sounds normal  NEURO: Normal strength and tone, mentation intact and speech normal  PSYCH: mentation appears normal, affect normal/bright    Diagnostic Test Results:  none     ASSESSMENT/PLAN:     1. Pre-diabetes  Controlled.  - metFORMIN (GLUCOPHAGE) 500 MG tablet; TAKE 1 TABLET(500 MG) BY MOUTH TWICE DAILY WITH MEALS  Dispense: 180 tablet; Refill: 0    2. ADHD (attention deficit hyperactivity disorder), inattentive type  Controlled with medication.  - amphetamine-dextroamphetamine (ADDERALL) 20 MG per tablet; Take 1 tablet (20 mg) by mouth 2 times daily  Dispense: 60 tablet; Refill: 0  - amphetamine-dextroamphetamine (ADDERALL) 20 MG per tablet; Take 1 tablet (20 mg) by mouth 2 times daily  Dispense: 60 tablet; Refill: 0  - amphetamine-dextroamphetamine (ADDERALL) 20 MG per tablet; Take 1 tablet (20 mg) by mouth 2 times daily  Dispense: 60 tablet; Refill: 0    3. Nausea  Controlled.  - ondansetron (ZOFRAN) 4 MG tablet; Take 1 tablet (4 mg) by mouth every 6 hours as needed for nausea  Dispense: 30 tablet; Refill: 3    4. Encounter for smoking cessation counseling  Continue with Chantix for smoking cessation.  - varenicline (CHANTIX) 1 MG tablet; Take 1 tablet (1 mg) by mouth 2 times daily  Dispense: 60 tablet; Refill: 3    5. Chronic maxillary sinusitis  Take daily allergy medication for symptoms.  Continue with other symptomatic care. ENT referral made if no improvement in symptoms.  - OTOLARYNGOLOGY REFERRAL    Home care instructions were reviewed with the patient. The risks, benefits and treatment options of prescribed medications or  other treatments have been discussed with the patient. The patient verbalized their understanding and should call or follow up if no improvement or if they develop further problems.      Patient Instructions   Medications refilled    ENT referral made        SHRUTHI Arellano [facility]

## 2018-03-22 ENCOUNTER — OFFICE VISIT (OUTPATIENT)
Dept: OTOLARYNGOLOGY | Facility: CLINIC | Age: 38
End: 2018-03-22
Payer: COMMERCIAL

## 2018-03-22 VITALS
HEIGHT: 62 IN | WEIGHT: 220.2 LBS | DIASTOLIC BLOOD PRESSURE: 95 MMHG | HEART RATE: 128 BPM | BODY MASS INDEX: 40.52 KG/M2 | RESPIRATION RATE: 20 BRPM | OXYGEN SATURATION: 96 % | SYSTOLIC BLOOD PRESSURE: 152 MMHG

## 2018-03-22 DIAGNOSIS — J34.89 NASAL OBSTRUCTION: ICD-10-CM

## 2018-03-22 DIAGNOSIS — J31.0 CHRONIC RHINITIS, UNSPECIFIED TYPE: Primary | ICD-10-CM

## 2018-03-22 PROCEDURE — 99204 OFFICE O/P NEW MOD 45 MIN: CPT | Performed by: OTOLARYNGOLOGY

## 2018-03-22 NOTE — MR AVS SNAPSHOT
After Visit Summary   3/22/2018    Alma Mae    MRN: [id number]           Patient Information     Date Of Birth          1980        Visit Information        Provider Department      3/22/2018 1:00 PM Arias Jones MD [facility]        Today's Diagnoses     Chronic rhinitis, unspecified type    -  1    Nasal obstruction          Care Instructions    Scheduling Information  To schedule your CT/MRI scan, please contact [facility] at [phone] OR [facility] at [phone]    To schedule your Surgery, please contact our Specialty Schedulers at [phone]      ENT Clinic Locations Clinic Hours Telephone Number     [facility]  [address]  [place] [address]   Monday:           1:00pm -- 5:00pm    Friday:              8:00am - 12:00pm   To schedule/reschedule an appointment with   Dr. Jones,   please contact our   Specialty Scheduling Department at:     [phone]       [facility]  [address] [place] [address] Tuesday:          8:00am -- 2:00pm         Urgent Care Locations Clinic Hours Telephone Numbers     [facility]  [address] [place] [address]     Monday-Friday:     11:00am - 9:00pm    Saturday-Sunday:  9:00am - 5:00pm   [phone]     [facility]  [address] [place] [address]     Monday-Friday:      5:00pm - 9:00pm     Saturday-Sunday:  9:00am - 5:00pm   [phone]                 Follow-ups after your visit        Future tests that were ordered for you today     Open Future Orders        Priority Expected Expires Ordered    CT Maxillofacial w/o Contrast Routine  5/6/2018 3/22/2018            Who to contact     If you have questions or need follow up information about today's clinic visit or your schedule please contact [facility] directly at [phone].  Normal or non-critical lab and imaging results will be communicated to you by Mandeep, letter  "or phone within 4 business days after the clinic has received the results. If you do not hear from us within 7 days, please contact the clinic through [employer] or phone. If you have a critical or abnormal lab result, we will notify you by phone as soon as possible.  Submit refill requests through [employer] or call your pharmacy and they will forward the refill request to us. Please allow 3 business days for your refill to be completed.          Additional Information About Your Visit        [employer] Information     [employer] gives you secure access to your electronic health record. If you see a primary care provider, you can also send messages to your care team and make appointments. If you have questions, please call your primary care clinic.  If you do not have a primary care provider, please call [phone] and they will assist you.        Care EveryWhere ID     This is your Care EveryWhere ID. This could be used by other organizations to access your [facility] medical records  [id number]        Your Vitals Were     Pulse Respirations Height Pulse Oximetry BMI (Body Mass Index)       128 20 1.581 m (5' 2.25\") 96% 39.95 kg/m2        Blood Pressure from Last 3 Encounters:   03/22/18 (!) 152/95   02/09/18 124/72   11/08/17 122/80    Weight from Last 3 Encounters:   03/22/18 99.9 kg (220 lb 3.2 oz)   02/09/18 97.8 kg (215 lb 9.6 oz)   11/08/17 97.5 kg (215 lb)               Primary Care Provider Office Phone # Fax #    Daniela SHRUTHI Balderrama [phone] [address] [place] [address]        Equal Access to Services     [facility] : Hadii analia collado hadashdaly Sojeremy, waaxda luqadaha, qaybta kaalmada shlomo oreilly. So [facility] [phone].    ATENCIÓN: Si habla español, tiene a graham disposición servicios gratuitos de asistencia lingüística. Llame al [phone].    We comply with applicable federal civil rights laws and [place] laws. We do not " discriminate on the basis of race, color, national origin, age, disability, sex, sexual orientation, or gender identity.            Thank you!     Thank you for choosing [facility]  for your care. Our goal is always to provide you with excellent care. Hearing back from our patients is one way we can continue to improve our services. Please take a few minutes to complete the written survey that you may receive in the mail after your visit with us. Thank you!             Your Updated Medication List - Protect others around you: Learn how to safely use, store and throw away your medicines at www.disposemymeds.org.          This list is accurate as of 3/22/18  1:37 PM.  Always use your most recent med list.                   Brand Name Dispense Instructions for use Diagnosis    * amphetamine-dextroamphetamine 20 MG per tablet    ADDERALL    60 tablet    Take 1 tablet (20 mg) by mouth 2 times daily Due for an appointment for additional refills    ADHD (attention deficit hyperactivity disorder), inattentive type       * amphetamine-dextroamphetamine 20 MG per tablet    ADDERALL    60 tablet    Take 1 tablet (20 mg) by mouth 2 times daily    ADHD (attention deficit hyperactivity disorder), inattentive type       * amphetamine-dextroamphetamine 20 MG per tablet   Start taking on:  4/12/2018    ADDERALL    60 tablet    Take 1 tablet (20 mg) by mouth 2 times daily    ADHD (attention deficit hyperactivity disorder), inattentive type       hydrochlorothiazide 12.5 MG Tabs tablet     90 tablet    Take 1 tablet (12.5 mg) by mouth daily    Benign essential hypertension       lisinopril 10 MG tablet    PRINIVIL/ZESTRIL    90 tablet    Take 1 tablet (10 mg) by mouth daily    Benign essential hypertension       metFORMIN 500 MG tablet    GLUCOPHAGE    180 tablet    TAKE 1 TABLET(500 MG) BY MOUTH TWICE DAILY WITH MEALS    Pre-diabetes       ondansetron 4 MG tablet    ZOFRAN    30 tablet    Take 1 tablet (4 mg) by mouth  every 6 hours as needed for nausea    Nausea       PARoxetine 20 MG tablet    PAXIL    90 tablet    TAKE 1 TABLET(20 MG) BY MOUTH EVERY MORNING    Mild episode of recurrent major depressive disorder (H)       traZODone 50 MG tablet    DESYREL     TK 1/2 TO 1 T PO QHS PRN        varenicline 1 MG tablet    CHANTIX    60 tablet    Take 1 tablet (1 mg) by mouth 2 times daily    Encounter for smoking cessation counseling       * Notice:  This list has 3 medication(s) that are the same as other medications prescribed for you. Read the directions carefully, and ask your doctor or other care provider to review them with you.

## 2018-03-22 NOTE — PATIENT INSTRUCTIONS
Scheduling Information  To schedule your CT/MRI scan, please contact [facility] at [phone] OR [facility] at [phone]    To schedule your Surgery, please contact our Specialty Schedulers at [phone]      ENT Clinic Locations Clinic Hours Telephone Number     [facility]  [address] [place] [address]   Monday:           1:00pm -- 5:00pm    Friday:              8:00am - 12:00pm   To schedule/reschedule an appointment with   Dr. Jones,   please contact our   Specialty Scheduling Department at:     [phone]       [facility]  [address] [place] [address] Tuesday:          8:00am -- 2:00pm         Urgent Care Locations Clinic Hours Telephone Numbers     [facility]  [address] [place] [place] [address]     Monday-Friday:     11:00am - 9:00pm    Saturday-Sunday:  9:00am - 5:00pm   [phone]     [facility]  [address] [place] [address]     Monday-Friday:      5:00pm - 9:00pm     Saturday-Sunday:  9:00am - 5:00pm   [phone]

## 2018-03-22 NOTE — LETTER
3/22/2018         RE: Alma Mae  [address] [place] [address]        Dear Colleague,    Thank you for referring your patient, Alma Mae, to the [facility]. Please see a copy of my visit note below.    History of Present Illness - Alma Mae is a 37 year old female here to see me for the first time due to sinus and nasal issues.    She moved here in summer of 2017, and since then has had nasal issues.  Previous to that she never had any allergies or any nasal issues at all.  But now she is waking up every morning with nasal congestion.  She has also had 5 sinus infections in the last nine months, as well as a few diagnosed ear infections.  She has been on Azithromycin, Doxy, and they help the pressure and pain.  She has also tried saline lavage, and flonase as well.      Past Medical History -   Patient Active Problem List   Diagnosis     Morbid obesity (H)     Benign essential hypertension     Pre-diabetes       Current Medications -   Current Outpatient Prescriptions:      metFORMIN (GLUCOPHAGE) 500 MG tablet, TAKE 1 TABLET(500 MG) BY MOUTH TWICE DAILY WITH MEALS, Disp: 180 tablet, Rfl: 0     ondansetron (ZOFRAN) 4 MG tablet, Take 1 tablet (4 mg) by mouth every 6 hours as needed for nausea, Disp: 30 tablet, Rfl: 3     varenicline (CHANTIX) 1 MG tablet, Take 1 tablet (1 mg) by mouth 2 times daily, Disp: 60 tablet, Rfl: 3     amphetamine-dextroamphetamine (ADDERALL) 20 MG per tablet, Take 1 tablet (20 mg) by mouth 2 times daily, Disp: 60 tablet, Rfl: 0     [START ON 4/12/2018] amphetamine-dextroamphetamine (ADDERALL) 20 MG per tablet, Take 1 tablet (20 mg) by mouth 2 times daily, Disp: 60 tablet, Rfl: 0     hydrochlorothiazide 12.5 MG TABS tablet, Take 1 tablet (12.5 mg) by mouth daily, Disp: 90 tablet, Rfl: 1     lisinopril (PRINIVIL/ZESTRIL) 10 MG tablet, Take 1 tablet (10 mg) by mouth daily, Disp: 90 tablet, Rfl: 1     amphetamine-dextroamphetamine (ADDERALL) 20 MG per tablet,  "Take 1 tablet (20 mg) by mouth 2 times daily Due for an appointment for additional refills, Disp: 60 tablet, Rfl: 0     PARoxetine (PAXIL) 20 MG tablet, TAKE 1 TABLET(20 MG) BY MOUTH EVERY MORNING, Disp: 90 tablet, Rfl: 1     traZODone (DESYREL) 50 MG tablet, TK 1/2 TO 1 T PO QHS PRN, Disp: , Rfl:     Allergies -   Allergies   Allergen Reactions     Penicillins Rash     Sulfa Drugs Rash       Social History -   Social History     Social History     Marital status: Single     Spouse name: N/A     Number of children: N/A     Years of education: N/A     Social History Main Topics     Smoking status: Former Smoker     Types: Cigarettes     Quit date: 7/8/2017     Smokeless tobacco: Never Used     Alcohol use No     Drug use: No     Sexual activity: Yes     Partners: Male     Other Topics Concern     Parent/Sibling W/ Cabg, Mi Or Angioplasty Before 65f 55m? No     Social History Narrative       Family History -   Family History   Problem Relation Age of Onset     Hypertension Mother      Anxiety Disorder Mother      DIABETES Father      Depression Father      Anxiety Disorder Son      Depression Son        Review of Systems - As per HPI and PMHx, otherwise 10+ system review of the head and neck, and general constitution is negative.    Physical Exam  BP (!) 152/95  Pulse 128  Resp 20  Ht 1.581 m (5' 2.25\")  Wt 99.9 kg (220 lb 3.2 oz)  SpO2 96%  BMI 39.95 kg/m2    General - The patient is well nourished and well developed, and appears to have good nutritional status.  Alert and oriented to person and place, answers questions and cooperates with examination appropriately.   Head and Face - Normocephalic and atraumatic, with no gross asymmetry noted of the contour of the facial features.  The facial nerve is intact, with strong symmetric movements.  Voice and Breathing - The patient was breathing comfortably without the use of accessory muscles. There was no wheezing, stridor, or stertor.  The patients voice was clear " and strong, and had appropriate pitch and quality.  Ears - The tympanic membranes are normal in appearance, bony landmarks are intact.  No retraction, perforation, or masses.  Normal mobility on valsalva maneuver, with no reports of dizziness on insuflation.  No fluid or purulence was seen in the external canal or the middle ear. No evidence of infection of the middle ear or external canal, cerumen was normal in appearance.  Eyes - Extraocular movements intact, and the pupils were reactive to light.  Sclera were not icteric or injected, conjunctiva were pink and moist.  Mouth - Dentition in fair condition, no masses, ulcerations, or erosions noted on examination of mucosa.  Mucosa is pink and moist. The tongue is mobile and midline, and the uvula is midline on elevation.  Palpation of the mucosa of the vestibules, buccal surfaces, hard and soft palate, oral tongue, and floor of mouth was soft.  The dental exam was notable for wear facets that were consistent with bruxism.  Palpation of the digastric muscles in the floor of mouth and also the TMJ's were tender.     Throat - The walls of the oropharynx were smooth, pink, moist, symmetric, and had no lesions or ulcerations.  The tonsillar pillars and soft palate were symmetric.  The uvula was midline on elevation.    Neck - Normal midline excursion of the laryngotracheal complex during swallowing.  Full range of motion on passive movement.  Palpation of the occipital, submental, submandibular, internal jugular chain, and supraclavicular nodes did not demonstrate any abnormal lymph nodes or masses.  The carotid pulse was palpable bilaterally.  Palpation of the thyroid was soft and smooth, with no nodules or goiter appreciated.  The trachea was mobile and midline.  Nose - External contour is symmetric, no gross deflection or scars.  Nasal mucosa is pink and moist with no abnormal mucus.  The septum was midline and non-obstructive, turbinates of normal size and position.   No polyps, masses, or purulence noted on examination.      A/P - Alma Mae is a 37 year old female  (J31.0) Chronic rhinitis, unspecified type  (primary encounter diagnosis)  (J34.89) Nasal obstruction    This certainly sounds like an environmental issue that has developed since moving here.  I would like to get a CT sinus to see if there is polyposis or some anatomic issue.  If that does not show anything, I think an allergy referral would be appropriate.    IF all else fails, temporomandibular disease might be an issue.        Again, thank you for allowing me to participate in the care of your patient.        Sincerely,        Arias Jones MD

## 2018-03-22 NOTE — PROGRESS NOTES
History of Present Illness - Alma Mae is a 37 year old female here to see me for the first time due to sinus and nasal issues.    She moved here in summer of 2017, and since then has had nasal issues.  Previous to that she never had any allergies or any nasal issues at all.  But now she is waking up every morning with nasal congestion.  She has also had 5 sinus infections in the last nine months, as well as a few diagnosed ear infections.  She has been on Azithromycin, Doxy, and they help the pressure and pain.  She has also tried saline lavage, and flonase as well.      Past Medical History -   Patient Active Problem List   Diagnosis     Morbid obesity (H)     Benign essential hypertension     Pre-diabetes       Current Medications -   Current Outpatient Prescriptions:      metFORMIN (GLUCOPHAGE) 500 MG tablet, TAKE 1 TABLET(500 MG) BY MOUTH TWICE DAILY WITH MEALS, Disp: 180 tablet, Rfl: 0     ondansetron (ZOFRAN) 4 MG tablet, Take 1 tablet (4 mg) by mouth every 6 hours as needed for nausea, Disp: 30 tablet, Rfl: 3     varenicline (CHANTIX) 1 MG tablet, Take 1 tablet (1 mg) by mouth 2 times daily, Disp: 60 tablet, Rfl: 3     amphetamine-dextroamphetamine (ADDERALL) 20 MG per tablet, Take 1 tablet (20 mg) by mouth 2 times daily, Disp: 60 tablet, Rfl: 0     [START ON 4/12/2018] amphetamine-dextroamphetamine (ADDERALL) 20 MG per tablet, Take 1 tablet (20 mg) by mouth 2 times daily, Disp: 60 tablet, Rfl: 0     hydrochlorothiazide 12.5 MG TABS tablet, Take 1 tablet (12.5 mg) by mouth daily, Disp: 90 tablet, Rfl: 1     lisinopril (PRINIVIL/ZESTRIL) 10 MG tablet, Take 1 tablet (10 mg) by mouth daily, Disp: 90 tablet, Rfl: 1     amphetamine-dextroamphetamine (ADDERALL) 20 MG per tablet, Take 1 tablet (20 mg) by mouth 2 times daily Due for an appointment for additional refills, Disp: 60 tablet, Rfl: 0     PARoxetine (PAXIL) 20 MG tablet, TAKE 1 TABLET(20 MG) BY MOUTH EVERY MORNING, Disp: 90 tablet, Rfl: 1      "traZODone (DESYREL) 50 MG tablet, TK 1/2 TO 1 T PO QHS PRN, Disp: , Rfl:     Allergies -   Allergies   Allergen Reactions     Penicillins Rash     Sulfa Drugs Rash       Social History -   Social History     Social History     Marital status: Single     Spouse name: N/A     Number of children: N/A     Years of education: N/A     Social History Main Topics     Smoking status: Former Smoker     Types: Cigarettes     Quit date: 7/8/2017     Smokeless tobacco: Never Used     Alcohol use No     Drug use: No     Sexual activity: Yes     Partners: Male     Other Topics Concern     Parent/Sibling W/ Cabg, Mi Or Angioplasty Before 65f 55m? No     Social History Narrative       Family History -   Family History   Problem Relation Age of Onset     Hypertension Mother      Anxiety Disorder Mother      DIABETES Father      Depression Father      Anxiety Disorder Son      Depression Son        Review of Systems - As per HPI and PMHx, otherwise 10+ system review of the head and neck, and general constitution is negative.    Physical Exam  BP (!) 152/95  Pulse 128  Resp 20  Ht 1.581 m (5' 2.25\")  Wt 99.9 kg (220 lb 3.2 oz)  SpO2 96%  BMI 39.95 kg/m2    General - The patient is well nourished and well developed, and appears to have good nutritional status.  Alert and oriented to person and place, answers questions and cooperates with examination appropriately.   Head and Face - Normocephalic and atraumatic, with no gross asymmetry noted of the contour of the facial features.  The facial nerve is intact, with strong symmetric movements.  Voice and Breathing - The patient was breathing comfortably without the use of accessory muscles. There was no wheezing, stridor, or stertor.  The patients voice was clear and strong, and had appropriate pitch and quality.  Ears - The tympanic membranes are normal in appearance, bony landmarks are intact.  No retraction, perforation, or masses.  Normal mobility on valsalva maneuver, with no " reports of dizziness on insuflation.  No fluid or purulence was seen in the external canal or the middle ear. No evidence of infection of the middle ear or external canal, cerumen was normal in appearance.  Eyes - Extraocular movements intact, and the pupils were reactive to light.  Sclera were not icteric or injected, conjunctiva were pink and moist.  Mouth - Dentition in fair condition, no masses, ulcerations, or erosions noted on examination of mucosa.  Mucosa is pink and moist. The tongue is mobile and midline, and the uvula is midline on elevation.  Palpation of the mucosa of the vestibules, buccal surfaces, hard and soft palate, oral tongue, and floor of mouth was soft.  The dental exam was notable for wear facets that were consistent with bruxism.  Palpation of the digastric muscles in the floor of mouth and also the TMJ's were tender.     Throat - The walls of the oropharynx were smooth, pink, moist, symmetric, and had no lesions or ulcerations.  The tonsillar pillars and soft palate were symmetric.  The uvula was midline on elevation.    Neck - Normal midline excursion of the laryngotracheal complex during swallowing.  Full range of motion on passive movement.  Palpation of the occipital, submental, submandibular, internal jugular chain, and supraclavicular nodes did not demonstrate any abnormal lymph nodes or masses.  The carotid pulse was palpable bilaterally.  Palpation of the thyroid was soft and smooth, with no nodules or goiter appreciated.  The trachea was mobile and midline.  Nose - External contour is symmetric, no gross deflection or scars.  Nasal mucosa is pink and moist with no abnormal mucus.  The septum was midline and non-obstructive, turbinates of normal size and position.  No polyps, masses, or purulence noted on examination.      A/P - Alma Mae is a 37 year old female  (J31.0) Chronic rhinitis, unspecified type  (primary encounter diagnosis)  (J34.89) Nasal obstruction    This  certainly sounds like an environmental issue that has developed since moving here.  I would like to get a CT sinus to see if there is polyposis or some anatomic issue.  If that does not show anything, I think an allergy referral would be appropriate.    IF all else fails, temporomandibular disease might be an issue.

## 2018-03-29 ENCOUNTER — HOSPITAL ENCOUNTER (OUTPATIENT)
Dept: CT IMAGING | Facility: CLINIC | Age: 38
Discharge: HOME | End: 2018-03-29
Attending: OTOLARYNGOLOGY | Admitting: OTOLARYNGOLOGY
Payer: COMMERCIAL

## 2018-03-29 DIAGNOSIS — J34.89 NASAL OBSTRUCTION: ICD-10-CM

## 2018-03-29 DIAGNOSIS — J31.0 CHRONIC RHINITIS, UNSPECIFIED TYPE: Primary | ICD-10-CM

## 2018-03-29 DIAGNOSIS — J31.0 CHRONIC RHINITIS, UNSPECIFIED TYPE: ICD-10-CM

## 2018-03-29 PROCEDURE — 70486 CT MAXILLOFACIAL W/O DYE: CPT

## 2018-03-29 NOTE — PROGRESS NOTES
Called and left a detailed voicemail.  The CT of the sinuses actually looks quite clear, no polyps, no anatomic obstructions, no fluid collections.  I recommend allergy referral for testing, and I have entered the referral.

## 2018-05-06 DIAGNOSIS — R73.03 PRE-DIABETES: ICD-10-CM

## 2018-05-07 NOTE — TELEPHONE ENCOUNTER
"Requested Prescriptions   Pending Prescriptions Disp Refills     metFORMIN (GLUCOPHAGE) 500 MG tablet [Pharmacy Med Name: METFORMIN 500MG TABLETS] 180 tablet 0     Sig: TAKE 1 TABLET(500 MG) BY MOUTH TWICE DAILY WITH MEALS    Biguanide Agents Failed    5/6/2018  3:38 AM       Failed - Blood pressure less than 140/90 in past 6 months    BP Readings from Last 3 Encounters:   03/22/18 (!) 152/95   02/09/18 124/72   11/08/17 122/80                Failed - Patient has had a Microalbumin in the past 12 mos.    No lab results found.         Failed - Patient has documented A1c within the specified period of time.    Recent Labs   Lab Test  07/20/17   1233   A1C  5.9            Passed - Patient has documented LDL within the past 12 mos.    Recent Labs   Lab Test  09/26/17   0840   LDL  114*            Passed - Patient is age 10 or older       Passed - Patient's CR is NOT>1.4 OR Patient's EGFR is NOT<45 within past 12 mos.    Recent Labs   Lab Test  09/26/17   0840   GFRESTIMATED  >90   GFRESTBLACK  >90       Recent Labs   Lab Test  09/26/17   0840   CR  0.66            Passed - Patient does NOT have a diagnosis of CHF.       Passed - Patient is not pregnant       Passed - Patient has not had a positive pregnancy test within the past 12 mos.        Passed - Recent (6 mo) or future (30 days) visit within the authorizing provider's specialty    Patient had office visit in the last 6 months or has a visit in the next 30 days with authorizing provider or within the authorizing provider's specialty.  See \"Patient Info\" tab in inbasket, or \"Choose Columns\" in Meds & Orders section of the refill encounter.            Last Written Prescription Date:  2/9/18  Last Fill Quantity: 180,  # refills: 0   Last office visit: 2/9/2018 with prescribing provider:     Future Office Visit:      "

## 2018-05-14 ENCOUNTER — MYC MEDICAL ADVICE (OUTPATIENT)
Dept: FAMILY MEDICINE | Facility: CLINIC | Age: 38
End: 2018-05-14

## 2018-05-14 ENCOUNTER — MYC REFILL (OUTPATIENT)
Dept: FAMILY MEDICINE | Facility: CLINIC | Age: 38
End: 2018-05-14

## 2018-05-14 DIAGNOSIS — F90.0 ADHD (ATTENTION DEFICIT HYPERACTIVITY DISORDER), INATTENTIVE TYPE: ICD-10-CM

## 2018-05-14 DIAGNOSIS — F90.0 ATTENTION DEFICIT HYPERACTIVITY DISORDER (ADHD), PREDOMINANTLY INATTENTIVE TYPE: Primary | ICD-10-CM

## 2018-05-14 RX ORDER — DEXTROAMPHETAMINE SACCHARATE, AMPHETAMINE ASPARTATE, DEXTROAMPHETAMINE SULFATE AND AMPHETAMINE SULFATE 5; 5; 5; 5 MG/1; MG/1; MG/1; MG/1
20 TABLET ORAL 2 TIMES DAILY
Qty: 60 TABLET | Refills: 0 | Status: CANCELLED | OUTPATIENT
Start: 2018-05-14

## 2018-05-14 NOTE — TELEPHONE ENCOUNTER
Message from [employer]:  Original authorizing provider: SHRUTHI Arellano CNP    Alma Mae would like a refill of the following medications:  amphetamine-dextroamphetamine (ADDERALL) 20 MG per tablet [SHRUTHI Arellano CNP]    Preferred pharmacy: [facility] [address] [place] [address] AT [address]    Comment:  Just let me know when the paper script is ready and i can come pick it up. Wong medel, Party

## 2018-05-15 RX ORDER — DEXTROAMPHETAMINE SACCHARATE, AMPHETAMINE ASPARTATE, DEXTROAMPHETAMINE SULFATE AND AMPHETAMINE SULFATE 5; 5; 5; 5 MG/1; MG/1; MG/1; MG/1
20 TABLET ORAL 2 TIMES DAILY
Qty: 60 TABLET | Refills: 0 | Status: SHIPPED | OUTPATIENT
Start: 2018-07-14 | End: 2018-08-22

## 2018-05-15 RX ORDER — DEXTROAMPHETAMINE SACCHARATE, AMPHETAMINE ASPARTATE, DEXTROAMPHETAMINE SULFATE AND AMPHETAMINE SULFATE 5; 5; 5; 5 MG/1; MG/1; MG/1; MG/1
20 TABLET ORAL 2 TIMES DAILY
Qty: 60 TABLET | Refills: 0 | Status: SHIPPED | OUTPATIENT
Start: 2018-05-15 | End: 2018-09-19

## 2018-05-15 RX ORDER — DEXTROAMPHETAMINE SACCHARATE, AMPHETAMINE ASPARTATE, DEXTROAMPHETAMINE SULFATE AND AMPHETAMINE SULFATE 5; 5; 5; 5 MG/1; MG/1; MG/1; MG/1
20 TABLET ORAL 2 TIMES DAILY
Qty: 60 TABLET | Refills: 0 | Status: SHIPPED | OUTPATIENT
Start: 2018-06-14 | End: 2018-09-19

## 2018-07-16 DIAGNOSIS — F33.0 MILD EPISODE OF RECURRENT MAJOR DEPRESSIVE DISORDER (H): ICD-10-CM

## 2018-07-16 RX ORDER — PAROXETINE 20 MG/1
TABLET, FILM COATED ORAL
Qty: 90 TABLET | Refills: 0 | Status: SHIPPED | OUTPATIENT
Start: 2018-07-16 | End: 2018-09-25

## 2018-07-16 NOTE — TELEPHONE ENCOUNTER
"Requested Prescriptions   Pending Prescriptions Disp Refills     PARoxetine (PAXIL) 20 MG tablet [Pharmacy Med Name: PAROXETINE 20MG TABLETS] 90 tablet 0     Sig: TAKE 1 TABLET(20 MG) BY MOUTH EVERY MORNING    SSRIs Protocol Failed    7/16/2018  3:53 AM       Failed - PHQ-9 score less than 5 in past 6 months    Please review last PHQ-9 score.          Passed - Patient is age 18 or older       Passed - No active pregnancy on record       Passed - No positive pregnancy test in last 12 months       Passed - Recent (6 mo) or future (30 days) visit within the authorizing provider's specialty    Patient had office visit in the last 6 months or has a visit in the next 30 days with authorizing provider or within the authorizing provider's specialty.  See \"Patient Info\" tab in inbasket, or \"Choose Columns\" in Meds & Orders section of the refill encounter.            Last Written Prescription Date:  1/2/18  Last Fill Quantity: 90,  # refills: 1   Last office visit: 2/9/2018 with prescribing provider:     Future Office Visit:      "

## 2018-08-15 DIAGNOSIS — I10 BENIGN ESSENTIAL HYPERTENSION: ICD-10-CM

## 2018-08-15 RX ORDER — HYDROCHLOROTHIAZIDE 12.5 MG/1
TABLET ORAL
Qty: 90 TABLET | Refills: 0 | Status: SHIPPED | OUTPATIENT
Start: 2018-08-15 | End: 2018-09-25

## 2018-08-15 RX ORDER — LISINOPRIL 10 MG/1
TABLET ORAL
Qty: 90 TABLET | Refills: 0 | Status: SHIPPED | OUTPATIENT
Start: 2018-08-15 | End: 2018-09-19

## 2018-08-15 NOTE — TELEPHONE ENCOUNTER
"Requested Prescriptions   Pending Prescriptions Disp Refills     lisinopril (PRINIVIL/ZESTRIL) 10 MG tablet [Pharmacy Med Name: LISINOPRIL 10MG TABLETS] 90 tablet 0     Sig: TAKE 1 TABLET(10 MG) BY MOUTH DAILY    ACE Inhibitors (Including Combos) Protocol Failed    8/15/2018  3:37 AM       Failed - Blood pressure under 140/90 in past 12 months    BP Readings from Last 3 Encounters:   03/22/18 (!) 152/95   02/09/18 124/72   11/08/17 122/80                Passed - Recent (12 mo) or future (30 days) visit within the authorizing provider's specialty    Patient had office visit in the last 12 months or has a visit in the next 30 days with authorizing provider or within the authorizing provider's specialty.  See \"Patient Info\" tab in inbasket, or \"Choose Columns\" in Meds & Orders section of the refill encounter.      Last Written Prescription Date:  1/31/18  Last Fill Quantity: 90,  # refills: 1   Last office visit: 2/9/2018 with prescribing provider:     Future Office Visit:           Passed - Patient is age 18 or older       Passed - No active pregnancy on record       Passed - Normal serum creatinine on file in past 12 months    Recent Labs   Lab Test  09/26/17   0840   CR  0.66            Passed - Normal serum potassium on file in past 12 months    Recent Labs   Lab Test  09/26/17   0840   POTASSIUM  3.8            Passed - No positive pregnancy test in past 12 months        hydrochlorothiazide 12.5 MG TABS tablet [Pharmacy Med Name: HYDROCHLOROTHIAZIDE 12.5MG TABLETS] 90 tablet 0     Sig: TAKE 1 TABLET(12.5 MG) BY MOUTH DAILY    Diuretics (Including Combos) Protocol Failed    8/15/2018  3:37 AM       Failed - Blood pressure under 140/90 in past 12 months    BP Readings from Last 3 Encounters:   03/22/18 (!) 152/95   02/09/18 124/72   11/08/17 122/80                Passed - Recent (12 mo) or future (30 days) visit within the authorizing provider's specialty    Patient had office visit in the last 12 months or has a " "visit in the next 30 days with authorizing provider or within the authorizing provider's specialty.  See \"Patient Info\" tab in inbasket, or \"Choose Columns\" in Meds & Orders section of the refill encounter    Last Written Prescription Date:  1/31/18  Last Fill Quantity: 90,  # refills: 1   Last office visit: 2/9/2018 with prescribing provider:     Future Office Visit:             Passed - Patient is age 18 or older       Passed - No active pregancy on record       Passed - Normal serum creatinine on file in past 12 months    Recent Labs   Lab Test  09/26/17   0840   CR  0.66             Passed - Normal serum potassium on file in past 12 months    Recent Labs   Lab Test  09/26/17   0840   POTASSIUM  3.8                   Passed - Normal serum sodium on file in past 12 months    Recent Labs   Lab Test  09/26/17   0840   NA  135             Passed - No positive pregnancy test in past 12 months          "

## 2018-08-20 DIAGNOSIS — R11.0 NAUSEA: ICD-10-CM

## 2018-08-20 RX ORDER — ONDANSETRON 4 MG/1
4 TABLET, FILM COATED ORAL EVERY 6 HOURS PRN
Qty: 30 TABLET | Refills: 2 | Status: SHIPPED | OUTPATIENT
Start: 2018-08-20 | End: 2018-11-19

## 2018-08-20 NOTE — TELEPHONE ENCOUNTER
"Requested Prescriptions   Pending Prescriptions Disp Refills     ondansetron (ZOFRAN) 4 MG tablet 30 tablet 0     Antivertigo/Antiemetic Agents Passed    8/20/2018 11:42 AM       Passed - Recent (12 mo) or future (30 days) visit within the authorizing provider's specialty    Patient had office visit in the last 12 months or has a visit in the next 30 days with authorizing provider or within the authorizing provider's specialty.  See \"Patient Info\" tab in inbasket, or \"Choose Columns\" in Meds & Orders section of the refill encounter.           Passed - Patient is 18 years of age or older        Last Written Prescription Date:  7/12/18  Last Fill Quantity: 30,  # refills: 0   Last office visit: 2/9/2018 with prescribing provider:  STU Maldonado   Future Office Visit:      "

## 2018-08-22 ENCOUNTER — MYC MEDICAL ADVICE (OUTPATIENT)
Dept: FAMILY MEDICINE | Facility: CLINIC | Age: 38
End: 2018-08-22

## 2018-08-22 DIAGNOSIS — F90.0 ATTENTION DEFICIT HYPERACTIVITY DISORDER (ADHD), PREDOMINANTLY INATTENTIVE TYPE: ICD-10-CM

## 2018-08-22 RX ORDER — DEXTROAMPHETAMINE SACCHARATE, AMPHETAMINE ASPARTATE, DEXTROAMPHETAMINE SULFATE AND AMPHETAMINE SULFATE 5; 5; 5; 5 MG/1; MG/1; MG/1; MG/1
20 TABLET ORAL 2 TIMES DAILY
Qty: 60 TABLET | Refills: 0 | Status: SHIPPED | OUTPATIENT
Start: 2018-08-22

## 2018-09-19 ENCOUNTER — OFFICE VISIT (OUTPATIENT)
Dept: ALLERGY | Facility: CLINIC | Age: 38
End: 2018-09-19
Payer: COMMERCIAL

## 2018-09-19 VITALS
BODY MASS INDEX: 40.72 KG/M2 | HEART RATE: 110 BPM | WEIGHT: 224.43 LBS | OXYGEN SATURATION: 96 % | DIASTOLIC BLOOD PRESSURE: 92 MMHG | SYSTOLIC BLOOD PRESSURE: 143 MMHG

## 2018-09-19 DIAGNOSIS — T46.4X5A ANGIOEDEMA DUE TO ANGIOTENSIN CONVERTING ENZYME INHIBITOR (ACE-I): ICD-10-CM

## 2018-09-19 DIAGNOSIS — Z88.0 HISTORY OF PENICILLIN ALLERGY: ICD-10-CM

## 2018-09-19 DIAGNOSIS — J30.89 OTHER ALLERGIC RHINITIS: Primary | ICD-10-CM

## 2018-09-19 DIAGNOSIS — T78.3XXA ANGIOEDEMA DUE TO ANGIOTENSIN CONVERTING ENZYME INHIBITOR (ACE-I): ICD-10-CM

## 2018-09-19 PROCEDURE — 99204 OFFICE O/P NEW MOD 45 MIN: CPT | Mod: 25 | Performed by: ALLERGY & IMMUNOLOGY

## 2018-09-19 PROCEDURE — 95004 PERQ TESTS W/ALRGNC XTRCS: CPT | Performed by: ALLERGY & IMMUNOLOGY

## 2018-09-19 RX ORDER — FLUTICASONE PROPIONATE 50 MCG
2 SPRAY, SUSPENSION (ML) NASAL DAILY
Qty: 1 BOTTLE | Refills: 11 | Status: SHIPPED | OUTPATIENT
Start: 2018-09-19

## 2018-09-19 NOTE — MR AVS SNAPSHOT
After Visit Summary   9/19/2018    Alma Mae    MRN: [id number]           Patient Information     Date Of Birth          1980        Visit Information        Provider Department      9/19/2018 4:00 PM Bhavana William MD [facility]        Today's Diagnoses     Other allergic rhinitis    -  1    Allergy to lisinopril        History of penicillin allergy          Care Instructions    If you have any questions regarding your allergies, asthma, or what we discussed during your visit today please call the allergy clinic or contact us via [employer].    [facility] ([place], [place]): [phone]      Start the flonase - 2 sprays in each nostril daily      Return for repeat skin testing and penicillin testing - do not take any antihistamines, cough/cold medications, or sleep aids for 7 days prior to this appointment          Follow-ups after your visit        Who to contact     If you have questions or need follow up information about today's clinic visit or your schedule please contact [facility] directly at [phone].  Normal or non-critical lab and imaging results will be communicated to you by [employer], letter or phone within 4 business days after the clinic has received the results. If you do not hear from us within 7 days, please contact the clinic through [employer] or phone. If you have a critical or abnormal lab result, we will notify you by phone as soon as possible.  Submit refill requests through [employer] or call your pharmacy and they will forward the refill request to us. Please allow 3 business days for your refill to be completed.          Additional Information About Your Visit        [employer] Information     [employer] gives you secure access to your electronic health record. If you see a primary care provider, you can also send messages to your care team and make appointments. If you have questions, please call your primary care clinic.  If you do not have  a primary care provider, please call [phone] and they will assist you.        Care EveryWhere ID     This is your Care EveryWhere ID. This could be used by other organizations to access your [place] medical records  [id number]        Your Vitals Were     Pulse Pulse Oximetry BMI (Body Mass Index)             110 96% 40.72 kg/m2          Blood Pressure from Last 3 Encounters:   09/25/18 (!) 150/102   09/19/18 (!) 143/92   03/22/18 (!) 152/95    Weight from Last 3 Encounters:   09/25/18 101.6 kg (224 lb)   09/19/18 101.8 kg (224 lb 6.9 oz)   03/22/18 99.9 kg (220 lb 3.2 oz)              We Performed the Following     ALLERGY SKIN TESTS,ALLERGENS          Today's Medication Changes          These changes are accurate as of 9/19/18 11:59 PM.  If you have any questions, ask your nurse or doctor.               Start taking these medicines.        Dose/Directions    fluticasone 50 MCG/ACT spray   Commonly known as:  FLONASE   Used for:  Other allergic rhinitis   Started by:  Bhavana William MD        Dose:  2 spray   Spray 2 sprays into both nostrils daily   Quantity:  1 Bottle   Refills:  11         These medicines have changed or have updated prescriptions.        Dose/Directions    amphetamine-dextroamphetamine 20 MG per tablet   Commonly known as:  ADDERALL   This may have changed:  Another medication with the same name was removed. Continue taking this medication, and follow the directions you see here.   Used for:  Attention deficit hyperactivity disorder (ADHD), predominantly inattentive type   Changed by:  Bhavana William MD        Dose:  20 mg   Take 1 tablet (20 mg) by mouth 2 times daily   Quantity:  60 tablet   Refills:  0         Stop taking these medicines if you haven't already. Please contact your care team if you have questions.     lisinopril 10 MG tablet   Commonly known as:  PRINIVIL/ZESTRIL   Stopped by:  Bhavana William MD           metFORMIN 500 MG tablet   Commonly known as:  GLUCOPHAGE   Stopped  by:  Bhavana William MD           varenicline 1 MG tablet   Commonly known as:  CHANTIX   Stopped by:  Bhavana William MD                Where to get your medicines      These medications were sent to [id number] - [facility] [address]  [address] [facility] [address] [address], [facility] [address]     Phone:  [phone]     fluticasone 50 MCG/ACT spray                Primary Care Provider Office Phone # Fax #    SHRUTHI Beaulieu [phone] [address] [facility] [address]        Equal Access to Services     [facility] : Hadii analia collado hadasho Sojeremy, waaxda luqadaha, qaybta kaalmada adeegyada, shlomo silva . So [facility] [phone].    ATENCIÓN: Si habla español, tiene a graham disposición servicios gratuitos de asistencia lingüística. [facility] [phone].    We comply with applicable federal civil rights laws and [place] laws. We do not discriminate on the basis of race, color, national origin, age, disability, sex, sexual orientation, or gender identity.            Thank you!     Thank you for choosing [facility]  for your care. Our goal is always to provide you with excellent care. Hearing back from our patients is one way we can continue to improve our services. Please take a few minutes to complete the written survey that you may receive in the mail after your visit with us. Thank you!             Your Updated Medication List - Protect others around you: Learn how to safely use, store and throw away your medicines at www.disposemymeds.org.          This list is accurate as of 9/19/18 11:59 PM.  Always use your most recent med list.                   Brand Name Dispense Instructions for use Diagnosis    amphetamine-dextroamphetamine 20 MG per tablet    ADDERALL    60 tablet    Take 1 tablet (20 mg) by mouth 2 times daily    Attention deficit hyperactivity disorder (ADHD), predominantly  inattentive type       fluticasone 50 MCG/ACT spray    FLONASE    1 Bottle    Spray 2 sprays into both nostrils daily    Other allergic rhinitis       hydrochlorothiazide 12.5 MG Tabs tablet     90 tablet    TAKE 1 TABLET(12.5 MG) BY MOUTH DAILY    Benign essential hypertension       ondansetron 4 MG tablet    ZOFRAN    30 tablet    Take 1 tablet (4 mg) by mouth every 6 hours as needed for nausea    Nausea       PARoxetine 20 MG tablet    PAXIL    90 tablet    TAKE 1 TABLET(20 MG) BY MOUTH EVERY MORNING    Mild episode of recurrent major depressive disorder (H)

## 2018-09-19 NOTE — PROGRESS NOTES
Dear Arias Jones MD    Thank you for referring your patient Alma Mae to the Allergy/Immunology Clinic. Alma Mae was seen in the Allergy Clinic at [facility]. The following are my recommendations regarding her Allergic Rhinitis, History of Penicillin Allergy, and Angioedema Due to ACE-Inhibitor    1. Return for allergy skin testing - remain off of all antihistamines and OTC sleep aids for 7 days prior to this visit  2. Schedule evaluation for penicillin allergy  3. Continue use of sinus irrigation rinse daily as needed  4. Begin fluticasone nasal spray, 2 sprays in each nostril daily - appropriate nasal spray technique reviewed  5. Discontinue lisinopril and avoid all ACE-Inhibitors in the future  6. Patient to follow up with Primary Care provider regarding elevated blood pressure.      Alma Mae is a 38 year old White female being seen today at the request of Dr. Jones in consultation for allergies.  She moved to [place] from [place] just over one year ago.  Since moving to [place] she reports that she has had symptoms of nasal congestion, sore throat, sounding hoarse, and itchy eyes.  Her  has also told her that she has been snoring more.  This past winter she was diagnosed with for sinus infections and was treated with several courses of antibiotics.  Due to her penicillin allergy she was treated with azithromycin on 3 separate occasions followed by a 21 day course of doxycycline.  She states that the antibiotics temporarily improve her symptoms however they have returned once medications have been discontinued.  Kati was referred to see Dr. Jones in the ENT clinic due to her chronic sinus symptoms.  He ordered a CT scan which was unremarkable for chronic sinus disease and she was referred to allergy for further evaluation.  She has taken antihistamines but has not felt that these have made a significant to present her symptoms.  She has also tried Flonase  but not for more than 5-7 days at a time.  She does use a sinus irrigation rinse daily.  Kati has not identified any particular differences in her environment between [facility] and [place] other than they have a new pet dog.  They have always had dogs but she her previous dogs have not shed much and they now have a rescue that does shed constantly.  She vacuums daily and has air filters in her home.  The dog does sleep in bed with her and her .    Kati also notes that she has been eating more dairy products since moving to [place] and has noticed increased rhinorrhea and throat clearing after eating dairy. She has not had any symptoms of rash, lip or tongue swelling, difficulty breathing, nausea, or vomiting.    Kati reports that she developed a rash after taking amoxicillin around each 6 or 7.  She does not recall any other symptoms and is unsure how long she had been on the medication before the rash resolved.    Kati has hypertension that has been managed with lisinopril. In early August she developed lip swelling while taking this medication.      Sinus CT 3/29/18:  IMPRESSION:  Minimal membrane thickening in the right antrum. Sinuses  are otherwise normal.      PAST MEDICAL HISTORY:  Hypertension  Diabetes    Family History   Problem Relation Age of Onset     Hypertension Mother      Anxiety Disorder Mother      Seasonal/Environmental Allergies Mother      Diabetes Father      Depression Father      Anxiety Disorder Son      Depression Son      History reviewed. No pertinent surgical history.    ENVIRONMENTAL HISTORY: [place] The home is heated with a forced air and gas furnace. They does have central air conditioning. The patient's bedroom is furnished with carpeting in bedroom, allergen mattress cover, allergen pillowcase cover and fabric window coverings.  Pets inside the house include 2 dog(s). There is no history of cockroach or mice infestation. There  is/are 0 smokers in the house.  The house does not have a damp basement.     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  Her spouse works as a Physician Assistant.    REVIEW OF SYSTEMS:  General: negative for weight gain. negative for weight loss. negative for changes in sleep.   Eyes: negative for itching. positive  for redness. positive  for tearing/watering. negative for vision changes  Ears: negative for fullness. negative for hearing loss. negative for dizziness.   Nose: positive  for snoring.positive  for changes in smell. negative for drainage.   Throat: positive  for hoarseness. negative for sore throat. negative for trouble swallowing.   Lungs: negative for cough. negative for shortness of breath.negative for wheezing. negative for sputum production.   Cardiovascular: negative for chest pain. negative for swelling of ankles. negative for fast or irregular heartbeat.   Gastrointestinal: negative for nausea. negative for heartburn. negative for acid reflux.   Musculoskeletal: negative for joint pain. negative for joint stiffness. negative for joint swelling.   Neurologic: negative for seizures. negative for fainting. negative for weakness.   Psychiatric: negative for changes in mood. negative for anxiety.   Endocrine: negative for cold intolerance. negative for heat intolerance. negative for tremors.   Hematologic: negative for easy bruising. negative for easy bleeding.  Integumentary: negative for rash. negative for scaling. negative for nail changes.       Current Outpatient Prescriptions:      amphetamine-dextroamphetamine (ADDERALL) 20 MG per tablet, Take 1 tablet (20 mg) by mouth 2 times daily, Disp: 60 tablet, Rfl: 0     amphetamine-dextroamphetamine (ADDERALL) 20 MG per tablet, Take 1 tablet (20 mg) by mouth 2 times daily, Disp: 60 tablet, Rfl: 0     amphetamine-dextroamphetamine (ADDERALL) 20 MG per tablet, Take 1 tablet  (20 mg) by mouth 2 times daily, Disp: 60 tablet, Rfl: 0     metFORMIN (GLUCOPHAGE) 500 MG tablet, TAKE 1 TABLET(500 MG) BY MOUTH TWICE DAILY WITH MEALS, Disp: 180 tablet, Rfl: 0     metFORMIN (GLUCOPHAGE) 500 MG tablet, TAKE 1 TABLET(500 MG) BY MOUTH TWICE DAILY WITH MEALS, Disp: 180 tablet, Rfl: 0     ondansetron (ZOFRAN) 4 MG tablet, Take 1 tablet (4 mg) by mouth every 6 hours as needed for nausea, Disp: 30 tablet, Rfl: 2     PARoxetine (PAXIL) 20 MG tablet, TAKE 1 TABLET(20 MG) BY MOUTH EVERY MORNING, Disp: 90 tablet, Rfl: 0     hydrochlorothiazide 12.5 MG TABS tablet, TAKE 1 TABLET(12.5 MG) BY MOUTH DAILY (Patient not taking: Reported on 9/19/2018), Disp: 90 tablet, Rfl: 0     lisinopril (PRINIVIL/ZESTRIL) 10 MG tablet, TAKE 1 TABLET(10 MG) BY MOUTH DAILY (Patient not taking: Reported on 9/19/2018), Disp: 90 tablet, Rfl: 0     varenicline (CHANTIX) 1 MG tablet, Take 1 tablet (1 mg) by mouth 2 times daily (Patient not taking: Reported on 9/19/2018), Disp: 60 tablet, Rfl: 3  Immunization History   Administered Date(s) Administered     TDAP Vaccine (Adacel) 07/20/2017     Allergies   Allergen Reactions     Penicillins Rash     Sulfa Drugs Rash         EXAM:   BP (!) 143/92 (BP Location: Left arm, Patient Position: Sitting, Cuff Size: Adult Large)  Pulse 110  Wt 101.8 kg (224 lb 6.9 oz)  SpO2 96%  BMI 40.72 kg/m2  GENERAL APPEARANCE: alert, cooperative and not in distress  SKIN: no rashes, no lesions  HEAD: atraumatic, normocephalic  EYES: lids and lashes normal, conjunctivae and sclerae clear, pupils equal, round, reactive to light, EOM full and intact  ENT: no scars or lesions, nasal exam showed no discharge, swelling or lesions noted, otoscopy showed external auditory canals clear, tympanic membranes normal, tongue midline and normal, soft palate, uvula, and tonsils normal  NECK: no asymmetry, masses, or scars, supple without significant adenopathy  LUNGS: unlabored respirations, no intercostal retractions or  accessory muscle use, clear to auscultation without rales or wheezes  HEART: regular rate and rhythm without murmurs and normal S1 and S2  MUSCULOSKELETAL: no musculoskeletal defects are noted  NEURO: no focal deficits noted  PSYCH: does not appear depressed or anxious    WORKUP: Skin testing    ENVIRONMENTAL PERCUTANEOUS SKIN TESTING: ADULT  [facility] 9/19/2018   Consent Y   Ordering Physician Dr. William   Interpreting Physician Dr. William   Testing Technician Gilma Trinidad, RN   Location Back   Time start:  4:30 PM   Time End:  4:45 PM   Positive Control: Histatrol*ALK 1 mg/ml 0   Negative Control: 50% Glycerin 0   Cat Hair*ALK (10,000 BAU/ml) 0   AP Dog Hair/Dander (1:100 w/v) 0   Dust Mite p. 30,000 AU/ml 0   Dust Mite f. (30,000 AU/ml) 0   Tomas (W/F in millimeters) 0   Grass Mix #7 (100,000 BAU/ml) 0   Red Cedar (W/F in millimeters) 0   Maple/Lake (W/F in millimeters) 0   Hackberry (W/F in millimeters) 0   [place] (W/F in millimeters) 0   [place] *ALK (W/F in millimeters) 0   American Elm (W/F in millimeters) 0   [place] (W/F in millimeters) 0   Black [place] (W/F in millimeters) 0   Birch Mix (W/F in millimeters) 0   Rich[place] (W/F in millimeters) 0   Oak (W/F in millimeters) 0   Cocklebur (W/F in millimeters) 0   [place] (W/F in millimeters) 0   White Edmond (W/F in millimeters) 0   Careless (W/F in millimeters) 0   Nettle (W/F in millimeters) 0   English Plantain (W/F in millimeters) 0   Kochia (W/F in millimeters) 0   Lamb's Quarter (W/F in millimeters) 0   Marshelder (W/F in millimeters) 0   Ragweed Mix* ALK (W/F in millimeters) 0   Russian Thistle (W/F in millimeters) 0   Sagebrush/Mugwort (W/F in millimeters) 0   Sheep Sorrel (W/F in millimeters) 0   Feather Mix* ALK (W/F in millimeters) 0   Penicillium Mix (1:10 w/v) 0   Curvularia spicifera (1:10 w/v) 0   Epicoccum (1:10 w/v) 0   Aspergillus fumigatus (1:10 w/v): 0   Alternaria tenius (1:10 w/v) 0   H. Cladosporium (1:10 w/v) 0    Phoma herbarum (1:10 w/v) 0        ASSESSMENT/PLAN:  Alma Mae is a 38 year old female here for evaluation of allergies. Skin prick testing was negative for allergic sensitization however she also had negative testing to positive control. Upon further questioning she reported she took an OTC sleep aid last night and was not aware that it contained an antihistamine. We discussed returning for repeat skin testing when off of antihistamines vs obtaining IgE testing. Kati elected to return for skin testing. We also reviewed setting up evaluation for her reported penicillin allergy which could be done at the same time as repeat aeroallergen testing. Due to developing symptoms of lip swelling while on lisinopril she was counseled to avoid all ACE-Inhibitor medications in the future. ARB and other agents may be used for ongoing management of her hypertension.    1. Return for allergy skin testing - remain off of all antihistamines and OTC sleep aids for 7 days prior to this visit  2. Schedule evaluation for penicillin allergy  3. Continue use of sinus irrigation rinse daily as needed  4. Begin fluticasone nasal spray, 2 sprays in each nostril daily - appropriate nasal spray technique reviewed  5. Discontinue lisinopril and avoid all ACE-Inhibitors in the future  6. Patient to follow up with Primary Care provider regarding elevated blood pressure.      Bhavana William MD  Allergy/Immunology  [place], [place]      Chart documentation done in part with Dragon Voice Recognition Software. Although reviewed after completion, some word and grammatical errors may remain.

## 2018-09-19 NOTE — NURSING NOTE
Per provider verbal order, RN placed Adult Environmental Panel scratch testing panels.  Consent was obtained prior to procedure.  Once panels were placed, patient was monitored for 15 minutes in clinic.  RN read test after 15 minutes and provider was notified of results.  Pt tolerated procedure well.  All questions and concerns were addressed at office visit.     Gilma Trinidad RN

## 2018-09-19 NOTE — NURSING NOTE
Attempted to schedule PCN challenge for patient.  She doesn't know her work schedule more than 1 week out.  Gave patient our card with phone # and advised that she request to speak with the allergy RN for repeat skin testing (pt didn't react due to recent Unisom use) plus PCN testing.  Both testings can be done the same day per Dr. William.  Patient will call to schedule appointment. Also gave patient a list of meds to avoid for testings.  Gilma Trinidad RN

## 2018-09-19 NOTE — PATIENT INSTRUCTIONS
If you have any questions regarding your allergies, asthma, or what we discussed during your visit today please call the allergy clinic or contact us via [employer].    [facility] ([place], [place]): [phone]      Start the flonase - 2 sprays in each nostril daily      Return for repeat skin testing and penicillin testing - do not take any antihistamines, cough/cold medications, or sleep aids for 7 days prior to this appointment

## 2018-09-19 NOTE — Clinical Note
9/19/2018         RE: Alma Mae  [id number] [facility] [address]        Dear Colleague,    Thank you for referring your patient, Alma Mae, to the [facility]. Please see a copy of my visit note below.    Dear Arias Jones MD    Thank you for referring your patient Alma Mae to the Allergy/Immunology Clinic. Alma Mae was seen in the Allergy Clinic at [facility]. The following are my recommendations regarding her {Allergydiagnoses:[id number]}    Alma Mae is a 38 year old White female being seen today at the request of Dr. Jones in consultation for allergies.  She moved to [place] from [place] just over one year ago.  Since moving to [place] she reports that she has had symptoms of nasal congestion, sore throat, sounding hoarse, and itchy eyes.  Her  has also told her that she has been snoring more.  This past winter.  He was diagnosed with for sinus infections and was treated with several courses of antibiotics.  Due to her penicillin allergy she was treated with azithromycin on 3 separate occasions followed by a 21 day course of doxycycline.  She states that the antibiotics temporarily improve her symptoms however they have returned once medications have been discontinued.  Kati was referred to see Dr. Jones in the ENT clinic due to her chronic sinus symptoms.  He ordered a CT scan which was unremarkable for chronic sinus disease and she was referred to allergy for further evaluation.  She has taken antihistamines but has not felt that these have made a significant to present her symptoms.  He has also tried Flonase but not for more than 5-7 days at a time.  She does use a sinus irrigation rinse daily.  Kati has not identified any particular differences in her environment between [facility] and [place] other than they have a new pet dog.  They have always had dogs but he does not have not  shut very much and they now have a rescue that  does shed constantly.  She vacuums daily and has air filters in her home.  The dog does sleep in bed with her and her .    Kati reports that she developed a rash after taking amoxicillin around each 6 or 7.  She does not recall any other symptoms.    Moved from [place] just over 1 year ago. Started waking up with nasal congestion and was snoring. Also has itchy eyes, wakes up with a sore throat and feels she sounds hoarse. Has tried dehumidifiers, essential oils, nasal sprays, sinus irigation risnes, antibiotics. Antibiotics temporarily help. Did 3 z-packs followed by 21 days of doxycycline. Has had dogs her entire life. Now has a new dog that is a different breed - sheds more than her previous dogs. Vacuums daily and has filter, dog sleeps in bed with her.    Eating more dairy - has more htroat clearing and rhinorrhea with dairy ingestion.    Uses an electronic sinus irrigation rinse daily. Had 4 or 5 sinus infections over the winter months.    Allergy to penicillin - developed a rash when she was about [age] years old. Used to have frequent ear infections.      Lisinopril - tongue swelling at the beginning of August    PAST MEDICAL HISTORY:  Hypertension  Diabetes    Family History   Problem Relation Age of Onset     Hypertension Mother      Anxiety Disorder Mother      Seasonal/Environmental Allergies Mother      Diabetes Father      Depression Father      Anxiety Disorder Son      Depression Son      History reviewed. No pertinent surgical history.    ENVIRONMENTAL HISTORY: [place] The home is heated with a forced air and gas furnace. They does have central air conditioning. The patient's bedroom is furnished with carpeting in bedroom, allergen mattress cover, allergen pillowcase cover and fabric window coverings.  Pets inside the house include 2 dog(s). There is no history of cockroach or mice infestation. There is/are 0 smokers in the house.  The house  does not have a damp basement.     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  Her spouse works as a Physician Assistant.    REVIEW OF SYSTEMS:  General: negative for weight gain. negative for weight loss. negative for changes in sleep.   Eyes: negative for itching. positive  for redness. positive  for tearing/watering. negative for vision changes  Ears: negative for fullness. negative for hearing loss. negative for dizziness.   Nose: positive  for snoring.positive  for changes in smell. negative for drainage.   Throat: positive  for hoarseness. negative for sore throat. negative for trouble swallowing.   Lungs: negative for cough. negative for shortness of breath.negative for wheezing. negative for sputum production.   Cardiovascular: negative for chest pain. negative for swelling of ankles. negative for fast or irregular heartbeat.   Gastrointestinal: negative for nausea. negative for heartburn. negative for acid reflux.   Musculoskeletal: negative for joint pain. negative for joint stiffness. negative for joint swelling.   Neurologic: negative for seizures. negative for fainting. negative for weakness.   Psychiatric: negative for changes in mood. negative for anxiety.   Endocrine: negative for cold intolerance. negative for heat intolerance. negative for tremors.   Hematologic: negative for easy bruising. negative for easy bleeding.  Integumentary: negative for rash. negative for scaling. negative for nail changes.       Current Outpatient Prescriptions:      amphetamine-dextroamphetamine (ADDERALL) 20 MG per tablet, Take 1 tablet (20 mg) by mouth 2 times daily, Disp: 60 tablet, Rfl: 0     amphetamine-dextroamphetamine (ADDERALL) 20 MG per tablet, Take 1 tablet (20 mg) by mouth 2 times daily, Disp: 60 tablet, Rfl: 0     amphetamine-dextroamphetamine (ADDERALL) 20 MG per tablet, Take 1 tablet (20 mg) by mouth 2 times daily, Disp: 60  tablet, Rfl: 0     metFORMIN (GLUCOPHAGE) 500 MG tablet, TAKE 1 TABLET(500 MG) BY MOUTH TWICE DAILY WITH MEALS, Disp: 180 tablet, Rfl: 0     metFORMIN (GLUCOPHAGE) 500 MG tablet, TAKE 1 TABLET(500 MG) BY MOUTH TWICE DAILY WITH MEALS, Disp: 180 tablet, Rfl: 0     ondansetron (ZOFRAN) 4 MG tablet, Take 1 tablet (4 mg) by mouth every 6 hours as needed for nausea, Disp: 30 tablet, Rfl: 2     PARoxetine (PAXIL) 20 MG tablet, TAKE 1 TABLET(20 MG) BY MOUTH EVERY MORNING, Disp: 90 tablet, Rfl: 0     hydrochlorothiazide 12.5 MG TABS tablet, TAKE 1 TABLET(12.5 MG) BY MOUTH DAILY (Patient not taking: Reported on 9/19/2018), Disp: 90 tablet, Rfl: 0     lisinopril (PRINIVIL/ZESTRIL) 10 MG tablet, TAKE 1 TABLET(10 MG) BY MOUTH DAILY (Patient not taking: Reported on 9/19/2018), Disp: 90 tablet, Rfl: 0     varenicline (CHANTIX) 1 MG tablet, Take 1 tablet (1 mg) by mouth 2 times daily (Patient not taking: Reported on 9/19/2018), Disp: 60 tablet, Rfl: 3  Immunization History   Administered Date(s) Administered     TDAP Vaccine (Adacel) 07/20/2017     Allergies   Allergen Reactions     Penicillins Rash     Sulfa Drugs Rash         EXAM:   BP (!) 143/92 (BP Location: Left arm, Patient Position: Sitting, Cuff Size: Adult Large)  Pulse 110  Wt 101.8 kg (224 lb 6.9 oz)  SpO2 96%  BMI 40.72 kg/m2  GENERAL APPEARANCE: alert, cooperative and not in distress  SKIN: no rashes, no lesions  HEAD: atraumatic, normocephalic  EYES: lids and lashes normal, conjunctivae and sclerae clear, pupils equal, round, reactive to light, EOM full and intact  ENT: no scars or lesions, nasal exam showed no discharge, swelling or lesions noted, otoscopy showed external auditory canals clear, tympanic membranes normal, tongue midline and normal, soft palate, uvula, and tonsils normal  NECK: no asymmetry, masses, or scars, supple without significant adenopathy  LUNGS: unlabored respirations, no intercostal retractions or accessory muscle use, clear to  auscultation without rales or wheezes  HEART: regular rate and rhythm without murmurs and normal S1 and S2  MUSCULOSKELETAL: no musculoskeletal defects are noted  NEURO: no focal deficits noted  PSYCH: does not appear depressed or anxious    WORKUP: Skin testing    ASSESSMENT/PLAN:  Alma Mae is a 38 year old female    ***  Patient to follow up with Primary Care provider regarding elevated blood pressure.      Bhavana William MD  Allergy/Immunology  [place], [place]      Chart documentation done in part with Dragon Voice Recognition Software. Although reviewed after completion, some word and grammatical errors may remain.    Again, thank you for allowing me to participate in the care of your patient.        Sincerely,        Bhavana William MD

## 2018-09-25 ENCOUNTER — OFFICE VISIT (OUTPATIENT)
Dept: FAMILY MEDICINE | Facility: CLINIC | Age: 38
End: 2018-09-25
Payer: COMMERCIAL

## 2018-09-25 VITALS
HEART RATE: 92 BPM | DIASTOLIC BLOOD PRESSURE: 102 MMHG | BODY MASS INDEX: 41.22 KG/M2 | SYSTOLIC BLOOD PRESSURE: 150 MMHG | TEMPERATURE: 97.4 F | WEIGHT: 224 LBS | RESPIRATION RATE: 18 BRPM | HEIGHT: 62 IN

## 2018-09-25 DIAGNOSIS — E66.01 MORBID OBESITY, UNSPECIFIED OBESITY TYPE (H): ICD-10-CM

## 2018-09-25 DIAGNOSIS — R73.03 PRE-DIABETES: ICD-10-CM

## 2018-09-25 DIAGNOSIS — I10 BENIGN ESSENTIAL HYPERTENSION: ICD-10-CM

## 2018-09-25 DIAGNOSIS — F90.9 ATTENTION DEFICIT HYPERACTIVITY DISORDER (ADHD), UNSPECIFIED ADHD TYPE: ICD-10-CM

## 2018-09-25 DIAGNOSIS — F33.0 MILD EPISODE OF RECURRENT MAJOR DEPRESSIVE DISORDER (H): Primary | ICD-10-CM

## 2018-09-25 DIAGNOSIS — E78.5 HYPERLIPIDEMIA LDL GOAL <130: ICD-10-CM

## 2018-09-25 DIAGNOSIS — Z11.4 SCREENING FOR HIV WITHOUT PRESENCE OF RISK FACTORS: ICD-10-CM

## 2018-09-25 LAB
ALBUMIN SERPL-MCNC: 3.7 G/DL (ref 3.4–5)
ALP SERPL-CCNC: 93 U/L (ref 40–150)
ALT SERPL W P-5'-P-CCNC: 82 U/L (ref 0–50)
ANION GAP SERPL CALCULATED.3IONS-SCNC: 6 MMOL/L (ref 3–14)
AST SERPL W P-5'-P-CCNC: 49 U/L (ref 0–45)
BILIRUB SERPL-MCNC: 0.2 MG/DL (ref 0.2–1.3)
BUN SERPL-MCNC: 9 MG/DL (ref 7–30)
CALCIUM SERPL-MCNC: 8.3 MG/DL (ref 8.5–10.1)
CHLORIDE SERPL-SCNC: 102 MMOL/L (ref 94–109)
CHOLEST SERPL-MCNC: 233 MG/DL
CO2 SERPL-SCNC: 28 MMOL/L (ref 20–32)
CREAT SERPL-MCNC: 0.6 MG/DL (ref 0.52–1.04)
GFR SERPL CREATININE-BSD FRML MDRD: >90 ML/MIN/1.7M2
GLUCOSE SERPL-MCNC: 137 MG/DL (ref 70–99)
HBA1C MFR BLD: 7.3 % (ref 0–5.6)
HDLC SERPL-MCNC: 31 MG/DL
LDLC SERPL CALC-MCNC: ABNORMAL MG/DL
LDLC SERPL DIRECT ASSAY-MCNC: 157 MG/DL
NONHDLC SERPL-MCNC: 202 MG/DL
POTASSIUM SERPL-SCNC: 4.5 MMOL/L (ref 3.4–5.3)
PROT SERPL-MCNC: 7.4 G/DL (ref 6.8–8.8)
SODIUM SERPL-SCNC: 136 MMOL/L (ref 133–144)
TRIGL SERPL-MCNC: 412 MG/DL

## 2018-09-25 PROCEDURE — 80053 COMPREHEN METABOLIC PANEL: CPT | Performed by: NURSE PRACTITIONER

## 2018-09-25 PROCEDURE — 36415 COLL VENOUS BLD VENIPUNCTURE: CPT | Performed by: NURSE PRACTITIONER

## 2018-09-25 PROCEDURE — 83036 HEMOGLOBIN GLYCOSYLATED A1C: CPT | Performed by: NURSE PRACTITIONER

## 2018-09-25 PROCEDURE — 83721 ASSAY OF BLOOD LIPOPROTEIN: CPT | Mod: 59 | Performed by: NURSE PRACTITIONER

## 2018-09-25 PROCEDURE — 87389 HIV-1 AG W/HIV-1&-2 AB AG IA: CPT | Performed by: NURSE PRACTITIONER

## 2018-09-25 PROCEDURE — 80061 LIPID PANEL: CPT | Performed by: NURSE PRACTITIONER

## 2018-09-25 PROCEDURE — 99214 OFFICE O/P EST MOD 30 MIN: CPT | Performed by: NURSE PRACTITIONER

## 2018-09-25 RX ORDER — HYDROCHLOROTHIAZIDE 25 MG/1
25 TABLET ORAL DAILY
Qty: 90 TABLET | Refills: 1 | Status: SHIPPED | OUTPATIENT
Start: 2018-09-25

## 2018-09-25 RX ORDER — DEXTROAMPHETAMINE SACCHARATE, AMPHETAMINE ASPARTATE, DEXTROAMPHETAMINE SULFATE AND AMPHETAMINE SULFATE 5; 5; 5; 5 MG/1; MG/1; MG/1; MG/1
20 TABLET ORAL 2 TIMES DAILY
Qty: 60 TABLET | Refills: 0 | Status: SHIPPED | OUTPATIENT
Start: 2018-10-25 | End: 2018-11-24

## 2018-09-25 RX ORDER — HYDROCHLOROTHIAZIDE 12.5 MG/1
TABLET ORAL
Qty: 90 TABLET | Refills: 1 | Status: CANCELLED | OUTPATIENT
Start: 2018-09-25

## 2018-09-25 RX ORDER — DEXTROAMPHETAMINE SACCHARATE, AMPHETAMINE ASPARTATE, DEXTROAMPHETAMINE SULFATE AND AMPHETAMINE SULFATE 5; 5; 5; 5 MG/1; MG/1; MG/1; MG/1
20 TABLET ORAL 2 TIMES DAILY
Qty: 60 TABLET | Refills: 0 | Status: SHIPPED | OUTPATIENT
Start: 2018-11-25 | End: 2018-12-19

## 2018-09-25 RX ORDER — DEXTROAMPHETAMINE SACCHARATE, AMPHETAMINE ASPARTATE, DEXTROAMPHETAMINE SULFATE AND AMPHETAMINE SULFATE 5; 5; 5; 5 MG/1; MG/1; MG/1; MG/1
20 TABLET ORAL 2 TIMES DAILY
Qty: 60 TABLET | Refills: 0 | Status: SHIPPED | OUTPATIENT
Start: 2018-09-25 | End: 2018-10-25

## 2018-09-25 RX ORDER — PAROXETINE 20 MG/1
TABLET, FILM COATED ORAL
Qty: 90 TABLET | Refills: 3 | Status: CANCELLED | OUTPATIENT
Start: 2018-09-25

## 2018-09-25 ASSESSMENT — PATIENT HEALTH QUESTIONNAIRE - PHQ9
SUM OF ALL RESPONSES TO PHQ QUESTIONS 1-9: 2
SUM OF ALL RESPONSES TO PHQ QUESTIONS 1-9: 2
10. IF YOU CHECKED OFF ANY PROBLEMS, HOW DIFFICULT HAVE THESE PROBLEMS MADE IT FOR YOU TO DO YOUR WORK, TAKE CARE OF THINGS AT HOME, OR GET ALONG WITH OTHER PEOPLE: NOT DIFFICULT AT ALL

## 2018-09-25 NOTE — LETTER
October 10, 2018      Alma Mae  [id number] [facility] [address]        Dear ,    We are writing to inform you of your test results.    Your hemoglobin A1C is significantly elevated from last check.  It is now 7.3 meaning that you are now diabetic.  I would like to increase your metformin to 1,000 mg twice daily.  Your cholesterol is still significant elevated including your triglycerides at 412.  I would like you to start fish oil 2,000 mg daily and work on dietary changes.  You should also increase physical activity to at least 30 minutes 5 times per week.  You will need close follow up when you move to [place] and may need to consider getting on a statin for better control of your cholesterol.  Please let me know if you have any questions.        Resulted Orders   Comprehensive metabolic panel   Result Value Ref Range    Sodium 136 133 - 144 mmol/L    Potassium 4.5 3.4 - 5.3 mmol/L    Chloride 102 94 - 109 mmol/L    Carbon Dioxide 28 20 - 32 mmol/L    Anion Gap 6 3 - 14 mmol/L    Glucose 137 (H) 70 - 99 mg/dL      Comment:      Fasting specimen    Urea Nitrogen 9 7 - 30 mg/dL    Creatinine 0.60 0.52 - 1.04 mg/dL    GFR Estimate >90 >60 mL/min/1.7m2      Comment:      Non  GFR Calc    GFR Estimate If Black >90 >60 mL/min/1.7m2      Comment:       GFR Calc    Calcium 8.3 (L) 8.5 - 10.1 mg/dL    Bilirubin Total 0.2 0.2 - 1.3 mg/dL    Albumin 3.7 3.4 - 5.0 g/dL    Protein Total 7.4 6.8 - 8.8 g/dL    Alkaline Phosphatase 93 40 - 150 U/L    ALT 82 (H) 0 - 50 U/L    AST 49 (H) 0 - 45 U/L   Lipid panel reflex to direct LDL Fasting   Result Value Ref Range    Cholesterol 233 (H) <200 mg/dL      Comment:      Desirable:       <200 mg/dl    Triglycerides 412 (H) <150 mg/dL      Comment:      Borderline high:  150-199 mg/dl  High:             200-499 mg/dl  Very high:       >499 mg/dl  Fasting specimen      HDL Cholesterol 31 (L) >49 mg/dL    LDL Cholesterol Calculated  <100  mg/dL     Cannot estimate LDL when triglyceride exceeds 400 mg/dL    Non HDL Cholesterol 202 (H) <130 mg/dL      Comment:      Above Desirable:  130-159 mg/dl  Borderline high:  160-189 mg/dl  High:             190-219 mg/dl  Very high:       >219 mg/dl     Hemoglobin A1c   Result Value Ref Range    Hemoglobin A1C 7.3 (H) 0 - 5.6 %      Comment:      Normal <5.7% Prediabetes 5.7-6.4%  Diabetes 6.5% or higher - adopted from ADA   consensus guidelines.     HIV Antigen Antibody Combo   Result Value Ref Range    HIV Antigen Antibody Combo Nonreactive NR^Nonreactive          Comment:      HIV-1 p24 Ag & HIV-1/HIV-2 Ab Not Detected   LDL cholesterol direct   Result Value Ref Range    LDL Cholesterol Direct 157 (H) <100 mg/dL      Comment:      Above desirable:  100-129 mg/dl  Borderline High:  130-159 mg/dL  High:             160-189 mg/dL  Very high:       >189 mg/dl         If you have any questions or concerns, please call the clinic at the number listed above.       Sincerely,        SHRUTHI Arellano CNP

## 2018-09-25 NOTE — PATIENT INSTRUCTIONS
Wean off the Paxil    Increase the hydrochlorothiazide to 25 mg-MyChart blood pressures in 1-2 weeks following increase    Labs today-we will notify you with those results

## 2018-09-25 NOTE — MR AVS SNAPSHOT
After Visit Summary   9/25/2018    Alma Mae    MRN: [id number]           Patient Information     Date Of Birth          1980        Visit Information        Provider Department      9/25/2018 9:20 AM Daniela Maldonado APRN CNP [facility]        Today's Diagnoses     Hyperlipidemia LDL goal <130    -  1    Benign essential hypertension        Pre-diabetes        Mild episode of recurrent major depressive disorder (H)        Attention deficit hyperactivity disorder (ADHD), unspecified ADHD type          Care Instructions    Wean off the Paxil    Increase the hydrochlorothiazide to 25 mg-MyChart blood pressures in 1-2 weeks following increase    Labs today-we will notify you with those results          Follow-ups after your visit        Who to contact     If you have questions or need follow up information about today's clinic visit or your schedule please contact [facility] directly at [phone].  Normal or non-critical lab and imaging results will be communicated to you by [employer], letter or phone within 4 business days after the clinic has received the results. If you do not hear from us within 7 days, please contact the clinic through [employer] or phone. If you have a critical or abnormal lab result, we will notify you by phone as soon as possible.  Submit refill requests through [employer] or call your pharmacy and they will forward the refill request to us. Please allow 3 business days for your refill to be completed.          Additional Information About Your Visit        MyChart Information     [employer] gives you secure access to your electronic health record. If you see a primary care provider, you can also send messages to your care team and make appointments. If you have questions, please call your primary care clinic.  If you do not have a primary care provider, please call [phone] and they will assist you.        Care EveryWhere ID      "This is your Care EveryWhere ID. This could be used by other organizations to access your [facility] medical records  [id number]        Your Vitals Were     Pulse Temperature Respirations Height BMI (Body Mass Index)       92 97.4  F (36.3  C) (Tympanic) 18 5' 2.25\" (1.581 m) 40.64 kg/m2        Blood Pressure from Last 3 Encounters:   09/25/18 (!) 150/102   09/19/18 (!) 143/92   03/22/18 (!) 152/95    Weight from Last 3 Encounters:   09/25/18 224 lb (101.6 kg)   09/19/18 224 lb 6.9 oz (101.8 kg)   03/22/18 220 lb 3.2 oz (99.9 kg)              We Performed the Following     Comprehensive metabolic panel     Hemoglobin A1c     Lipid panel reflex to direct LDL Fasting          Today's Medication Changes          These changes are accurate as of 9/25/18 10:02 AM.  If you have any questions, ask your nurse or doctor.               Start taking these medicines.        Dose/Directions    blood glucose lancets standard   Commonly known as:  no brand specified   Used for:  Pre-diabetes   Started by:  Daniela Maldonado APRN CNP        Use to test blood sugars 1 times daily or as directed-has BD One Touch   Quantity:  100 each   Refills:  11       blood glucose monitoring test strip   Commonly known as:  no brand specified   Used for:  Pre-diabetes   Started by:  Daniela Maldonado APRN CNP        Use to test blood sugars 1 times daily or as directed -has BD One Touch   Quantity:  100 strip   Refills:  3         These medicines have changed or have updated prescriptions.        Dose/Directions    * amphetamine-dextroamphetamine 20 MG per tablet   Commonly known as:  ADDERALL   This may have changed:  Another medication with the same name was added. Make sure you understand how and when to take each.   Used for:  Attention deficit hyperactivity disorder (ADHD), predominantly inattentive type   Changed by:  Daniela Maldonado APRN CNP        Dose:  20 mg   Take 1 tablet (20 mg) by mouth 2 times daily   Quantity:  60 " tablet   Refills:  0       * amphetamine-dextroamphetamine 20 MG per tablet   Commonly known as:  ADDERALL   This may have changed:  You were already taking a medication with the same name, and this prescription was added. Make sure you understand how and when to take each.   Used for:  Attention deficit hyperactivity disorder (ADHD), unspecified ADHD type   Changed by:  Daniela Maldonado APRN CNP        Dose:  20 mg   Take 1 tablet (20 mg) by mouth 2 times daily   Quantity:  60 tablet   Refills:  0       * amphetamine-dextroamphetamine 20 MG per tablet   Commonly known as:  ADDERALL   This may have changed:  You were already taking a medication with the same name, and this prescription was added. Make sure you understand how and when to take each.   Used for:  Attention deficit hyperactivity disorder (ADHD), unspecified ADHD type   Changed by:  Daniela Maldonado APRN CNP        Dose:  20 mg   Start taking on:  10/25/2018   Take 1 tablet (20 mg) by mouth 2 times daily   Quantity:  60 tablet   Refills:  0       * amphetamine-dextroamphetamine 20 MG per tablet   Commonly known as:  ADDERALL   This may have changed:  You were already taking a medication with the same name, and this prescription was added. Make sure you understand how and when to take each.   Used for:  Attention deficit hyperactivity disorder (ADHD), unspecified ADHD type   Changed by:  Daniela Maldonado APRN CNP        Dose:  20 mg   Start taking on:  11/25/2018   Take 1 tablet (20 mg) by mouth 2 times daily   Quantity:  60 tablet   Refills:  0       * hydrochlorothiazide 12.5 MG Tabs tablet   This may have changed:  Another medication with the same name was added. Make sure you understand how and when to take each.   Used for:  Benign essential hypertension   Changed by:  Daniela Maldonado APRN CNP        TAKE 1 TABLET(12.5 MG) BY MOUTH DAILY   Quantity:  90 tablet   Refills:  0       * hydrochlorothiazide 25 MG tablet   Commonly  known as:  HYDRODIURIL   This may have changed:  You were already taking a medication with the same name, and this prescription was added. Make sure you understand how and when to take each.   Used for:  Benign essential hypertension   Changed by:  Daniela Maldonado APRN CNP        Dose:  25 mg   Take 1 tablet (25 mg) by mouth daily   Quantity:  90 tablet   Refills:  1       * Notice:  This list has 6 medication(s) that are the same as other medications prescribed for you. Read the directions carefully, and ask your doctor or other care provider to review them with you.         Where to get your medicines      These medications were sent to [facility] [address] [address] AT [facility] OF [address] [place] [address]  [address] [facility] [address]     Phone:  [phone]     blood glucose lancets standard    blood glucose monitoring test strip    hydrochlorothiazide 25 MG tablet    metFORMIN 500 MG tablet         Some of these will need a paper prescription and others can be bought over the counter.  Ask your nurse if you have questions.     Bring a paper prescription for each of these medications     amphetamine-dextroamphetamine 20 MG per tablet    amphetamine-dextroamphetamine 20 MG per tablet    amphetamine-dextroamphetamine 20 MG per tablet                Primary Care Provider Office Phone # Fax #    SHRUTHI Beaulieu [phone] [address] [address]        Equal Access to Services     FRANCIA WELCH AH: Hadii analia collado hadasho Soracielali, waaxda luqadaha, qaybta kaalmada adefrankyyada, shlomo srivastava. So [facility] [phone].    ATENCIÓN: Si habla español, tiene a graham disposición servicios gratuitos de asistencia lingüística. Ortiz al [phone].    We comply with applicable federal civil rights laws and [place] laws. We do not discriminate on the basis of race, color, national origin, age, disability, sex,  sexual orientation, or gender identity.            Thank you!     Thank you for choosing [facility]  for your care. Our goal is always to provide you with excellent care. Hearing back from our patients is one way we can continue to improve our services. Please take a few minutes to complete the written survey that you may receive in the mail after your visit with us. Thank you!             Your Updated Medication List - Protect others around you: Learn how to safely use, store and throw away your medicines at www.disposemymeds.org.          This list is accurate as of 9/25/18 10:02 AM.  Always use your most recent med list.                   Brand Name Dispense Instructions for use Diagnosis    * amphetamine-dextroamphetamine 20 MG per tablet    ADDERALL    60 tablet    Take 1 tablet (20 mg) by mouth 2 times daily    Attention deficit hyperactivity disorder (ADHD), predominantly inattentive type       * amphetamine-dextroamphetamine 20 MG per tablet    ADDERALL    60 tablet    Take 1 tablet (20 mg) by mouth 2 times daily    Attention deficit hyperactivity disorder (ADHD), unspecified ADHD type       * amphetamine-dextroamphetamine 20 MG per tablet   Start taking on:  10/25/2018    ADDERALL    60 tablet    Take 1 tablet (20 mg) by mouth 2 times daily    Attention deficit hyperactivity disorder (ADHD), unspecified ADHD type       * amphetamine-dextroamphetamine 20 MG per tablet   Start taking on:  11/25/2018    ADDERALL    60 tablet    Take 1 tablet (20 mg) by mouth 2 times daily    Attention deficit hyperactivity disorder (ADHD), unspecified ADHD type       blood glucose lancets standard    no brand specified    100 each    Use to test blood sugars 1 times daily or as directed-has BD One Touch    Pre-diabetes       blood glucose monitoring test strip    no brand specified    100 strip    Use to test blood sugars 1 times daily or as directed -has BD One Touch    Pre-diabetes       fluticasone 50  MCG/ACT spray    FLONASE    1 Bottle    Spray 2 sprays into both nostrils daily    Other allergic rhinitis       * hydrochlorothiazide 12.5 MG Tabs tablet     90 tablet    TAKE 1 TABLET(12.5 MG) BY MOUTH DAILY    Benign essential hypertension       * hydrochlorothiazide 25 MG tablet    HYDRODIURIL    90 tablet    Take 1 tablet (25 mg) by mouth daily    Benign essential hypertension       metFORMIN 500 MG tablet    GLUCOPHAGE    180 tablet    TAKE 1 TABLET(500 MG) BY MOUTH TWICE DAILY WITH MEALS    Pre-diabetes       ondansetron 4 MG tablet    ZOFRAN    30 tablet    Take 1 tablet (4 mg) by mouth every 6 hours as needed for nausea    Nausea       PARoxetine 20 MG tablet    PAXIL    90 tablet    TAKE 1 TABLET(20 MG) BY MOUTH EVERY MORNING    Mild episode of recurrent major depressive disorder (H)       * Notice:  This list has 6 medication(s) that are the same as other medications prescribed for you. Read the directions carefully, and ask your doctor or other care provider to review them with you.

## 2018-09-25 NOTE — NURSING NOTE
"Chief Complaint   Patient presents with     A.D.H.D     Hypertension     Diabetes     Depression       Initial BP (!) 150/102 (Cuff Size: Adult Large)  Pulse 92  Temp 97.4  F (36.3  C) (Tympanic)  Resp 18  Ht 5' 2.25\" (1.581 m)  Wt 224 lb (101.6 kg)  BMI 40.64 kg/m2 Estimated body mass index is 40.64 kg/(m^2) as calculated from the following:    Height as of this encounter: 5' 2.25\" (1.581 m).    Weight as of this encounter: 224 lb (101.6 kg).    Patient presents to the clinic using No DME    Health Maintenance that is potentially due pending provider review:  HPV screening     Pt had PAP done on this date 7/3/2017 , with this group [facility], results were normal. HPV only if ASCUS    Harriet Cosme CMA        "

## 2018-09-25 NOTE — PROGRESS NOTES
"  SUBJECTIVE:   Alma Mae is a 38 year old female who presents to clinic today for the following health issues:    ADHD Follow-Up    Date of last ADHD office visit: 2/9/2018  Status since last visit: Stable  Taking controlled (daily) medications as prescribed: Yes                       Parent/Patient Concerns with Medications: None  ADHD Medication     Amphetamines Disp Start End    amphetamine-dextroamphetamine (ADDERALL) 20 MG per tablet 60 tablet 9/25/2018 10/25/2018    Sig - Route: Take 1 tablet (20 mg) by mouth 2 times daily - Oral    Class: Local Print    amphetamine-dextroamphetamine (ADDERALL) 20 MG per tablet 60 tablet 10/25/2018 11/24/2018    Sig - Route: Take 1 tablet (20 mg) by mouth 2 times daily - Oral    Class: Local Print    amphetamine-dextroamphetamine (ADDERALL) 20 MG per tablet 60 tablet 11/25/2018 12/25/2018    Sig - Route: Take 1 tablet (20 mg) by mouth 2 times daily - Oral    Class: Local Print    amphetamine-dextroamphetamine (ADDERALL) 20 MG per tablet 60 tablet 8/22/2018     Sig - Route: Take 1 tablet (20 mg) by mouth 2 times daily - Oral    Class: Local Print        Sleep: no problems    Medication Benefits:   Controlled symptoms: Attention span, Distractability and Finishing tasks  Uncontrolled Symptoms: None    Medication side effects:  Side effects noted: none  Denies: appetite suppression, weight loss, insomnia, tics, palpitations, stomach ache, headache, emotional lability, rebound irritability, drowsiness, \"zombie\" effect, growth suppression and dry mouth    Diabetes Follow-up      Patient is checking blood sugars: not at all    Diabetic concerns: None and other - would like to check blood sugars      Symptoms of hypoglycemia (low blood sugar): none     Paresthesias (numbness or burning in feet) or sores: No     Date of last diabetic eye exam: \"last year\"    BP Readings from Last 2 Encounters:   09/25/18 (!) 150/102   09/19/18 (!) 143/92     Hemoglobin A1C (%)   Date Value "   07/20/2017 5.9     LDL Cholesterol Calculated (mg/dL)   Date Value   09/26/2017 114 (H)       Diabetes Management Resources    Hypertension Follow-up      Outpatient blood pressures are being checked at home.  Results are 150/90's.    Low Salt Diet: no added salt    Depression Followup    Status since last visit: Stable     See PHQ-9 for current symptoms.  Other associated symptoms: None    Complicating factors:   Significant life event:  No   Current substance abuse:  None  Anxiety or Panic symptoms:  Yes-  Anxiety     PHQ-9 11/8/2017 9/25/2018   Total Score 3 2   Q9: Suicide Ideation Not at all Not at all     PHQ-9 ([employer]) 9/25/2018   1.  Little interest or pleasure in doing things 0   2.  Feeling down, depressed, or hopeless 0   3.  Trouble falling or staying asleep, or sleeping too much 1   4.  Feeling tired or having little energy 0   5.  Poor appetite or overeating 0   6.  Feeling bad about yourself 0   7.  Trouble concentrating 1   8.  Moving slowly or restless 0   9.  Suicidal or self-harm thoughts 0   PHQ-9 Total Score 2     PHQ-9  English  PHQ-9   Any Language  Suicide Assessment Five-step Evaluation and Treatment (SAFE-T)      Amount of exercise or physical activity: 2-3 days/week for an average of 30-45 minutes    Problems taking medications regularly: No    Medication side effects: none    Diet: regular (no restrictions)    Wanting to go off the Paxil-does not feel that she needs it    Problem list and histories reviewed & adjusted, as indicated.  Additional history: as documented    Patient Active Problem List   Diagnosis     Morbid obesity (H)     Benign essential hypertension     Pre-diabetes     History reviewed. No pertinent surgical history.    Social History   Substance Use Topics     Smoking status: Former Smoker     Types: Cigarettes     Quit date: 7/8/2017     Smokeless tobacco: Never Used     Alcohol use No     Family History   Problem Relation Age of Onset     Hypertension Mother       Anxiety Disorder Mother      Seasonal/Environmental Allergies Mother      Diabetes Father      Depression Father      Anxiety Disorder Son      Depression Son          Current Outpatient Prescriptions   Medication Sig Dispense Refill     amphetamine-dextroamphetamine (ADDERALL) 20 MG per tablet Take 1 tablet (20 mg) by mouth 2 times daily 60 tablet 0     [START ON 10/25/2018] amphetamine-dextroamphetamine (ADDERALL) 20 MG per tablet Take 1 tablet (20 mg) by mouth 2 times daily 60 tablet 0     [START ON 11/25/2018] amphetamine-dextroamphetamine (ADDERALL) 20 MG per tablet Take 1 tablet (20 mg) by mouth 2 times daily 60 tablet 0     amphetamine-dextroamphetamine (ADDERALL) 20 MG per tablet Take 1 tablet (20 mg) by mouth 2 times daily 60 tablet 0     blood glucose (NO BRAND SPECIFIED) lancets standard Use to test blood sugars 1 times daily or as directed-has BD One Touch 100 each 11     blood glucose monitoring (NO BRAND SPECIFIED) test strip Use to test blood sugars 1 times daily or as directed -has BD One Touch 100 strip 3     fluticasone (FLONASE) 50 MCG/ACT spray Spray 2 sprays into both nostrils daily 1 Bottle 11     hydrochlorothiazide (HYDRODIURIL) 25 MG tablet Take 1 tablet (25 mg) by mouth daily 90 tablet 1     hydrochlorothiazide 12.5 MG TABS tablet TAKE 1 TABLET(12.5 MG) BY MOUTH DAILY 90 tablet 0     metFORMIN (GLUCOPHAGE) 500 MG tablet TAKE 1 TABLET(500 MG) BY MOUTH TWICE DAILY WITH MEALS 180 tablet 1     ondansetron (ZOFRAN) 4 MG tablet Take 1 tablet (4 mg) by mouth every 6 hours as needed for nausea 30 tablet 2     PARoxetine (PAXIL) 20 MG tablet TAKE 1 TABLET(20 MG) BY MOUTH EVERY MORNING 90 tablet 0     [DISCONTINUED] metFORMIN (GLUCOPHAGE) 500 MG tablet TAKE 1 TABLET(500 MG) BY MOUTH TWICE DAILY WITH MEALS 180 tablet 0     Allergies   Allergen Reactions     Lisinopril Swelling     Developed lip swelling in early 8/2018     Penicillins Rash     Sulfa Drugs Rash     Labs reviewed in EPIC    Reviewed and  "updated as needed this visit by clinical staff  Tobacco  Allergies  Meds  Med Hx  Surg Hx  Fam Hx  Soc Hx      Reviewed and updated as needed this visit by Provider         ROS:  Constitutional, HEENT, cardiovascular, pulmonary, gi and gu systems are negative, except as otherwise noted.    OBJECTIVE:     BP (!) 150/102 (Cuff Size: Adult Large)  Pulse 92  Temp 97.4  F (36.3  C) (Tympanic)  Resp 18  Ht 5' 2.25\" (1.581 m)  Wt 224 lb (101.6 kg)  BMI 40.64 kg/m2  Body mass index is 40.64 kg/(m^2).  GENERAL: healthy, alert and no distress  NECK: no adenopathy, no asymmetry, masses, or scars and thyroid normal to palpation  RESP: lungs clear to auscultation - no rales, rhonchi or wheezes  CV: regular rate and rhythm, normal S1 S2, no S3 or S4, no murmur, click or rub, no peripheral edema and peripheral pulses strong  ABDOMEN: soft, nontender, no hepatosplenomegaly, no masses and bowel sounds normal  MS: no gross musculoskeletal defects noted, no edema  PSYCH: mentation appears normal, affect normal/bright    Diagnostic Test Results:  Results for orders placed or performed in visit on 09/25/18   Comprehensive metabolic panel   Result Value Ref Range    Sodium 136 133 - 144 mmol/L    Potassium 4.5 3.4 - 5.3 mmol/L    Chloride 102 94 - 109 mmol/L    Carbon Dioxide 28 20 - 32 mmol/L    Anion Gap 6 3 - 14 mmol/L    Glucose 137 (H) 70 - 99 mg/dL    Urea Nitrogen 9 7 - 30 mg/dL    Creatinine 0.60 0.52 - 1.04 mg/dL    GFR Estimate >90 >60 mL/min/1.7m2    GFR Estimate If Black >90 >60 mL/min/1.7m2    Calcium 8.3 (L) 8.5 - 10.1 mg/dL    Bilirubin Total 0.2 0.2 - 1.3 mg/dL    Albumin 3.7 3.4 - 5.0 g/dL    Protein Total 7.4 6.8 - 8.8 g/dL    Alkaline Phosphatase 93 40 - 150 U/L    ALT 82 (H) 0 - 50 U/L    AST 49 (H) 0 - 45 U/L   Lipid panel reflex to direct LDL Fasting   Result Value Ref Range    Cholesterol 233 (H) <200 mg/dL    Triglycerides 412 (H) <150 mg/dL    HDL Cholesterol 31 (L) >49 mg/dL    LDL Cholesterol " Calculated  <100 mg/dL     Cannot estimate LDL when triglyceride exceeds 400 mg/dL    Non HDL Cholesterol 202 (H) <130 mg/dL   Hemoglobin A1c   Result Value Ref Range    Hemoglobin A1C 7.3 (H) 0 - 5.6 %   HIV Antigen Antibody Combo   Result Value Ref Range    HIV Antigen Antibody Combo Nonreactive NR^Nonreactive       LDL cholesterol direct   Result Value Ref Range    LDL Cholesterol Direct 157 (H) <100 mg/dL       ASSESSMENT/PLAN:     1. Mild episode of recurrent major depressive disorder (H)  Stable.  Ok to wean off Paxil and monitor symptoms.    2. Morbid obesity, unspecified obesity type (H)  Morbid obesity with comorbid HTN, hyperlipidemia and pre-diabetes.  Dietary and physical activity recommendations made.    3. Benign essential hypertension  Not controlled-will increase hydrochlorothiazide and recheck BP in 2 weeks.  Offered PGEN study however Alma is moving out of [place] at the end of the year so would not be able to complete requirements.  - Comprehensive metabolic panel  - hydrochlorothiazide (HYDRODIURIL) 25 MG tablet; Take 1 tablet (25 mg) by mouth daily  Dispense: 90 tablet; Refill: 1    4. Pre-diabetes  Not controlled. Increase Metformin to 1000 mg BID, recheck in 3 months.  - metFORMIN (GLUCOPHAGE) 500 MG tablet; TAKE 1 TABLET(500 MG) BY MOUTH TWICE DAILY WITH MEALS  Dispense: 180 tablet; Refill: 1  - Comprehensive metabolic panel  - Hemoglobin A1c  - blood glucose monitoring (NO BRAND SPECIFIED) test strip; Use to test blood sugars 1 times daily or as directed -has BD One Touch  Dispense: 100 strip; Refill: 3  - blood glucose (NO BRAND SPECIFIED) lancets standard; Use to test blood sugars 1 times daily or as directed-has BD One Touch  Dispense: 100 each; Refill: 11    5. Hyperlipidemia LDL goal <130  Start fish oil and lifestyle changes, may need to consider statin in the near future.  - Lipid panel reflex to direct LDL Fasting    6. Attention deficit hyperactivity disorder (ADHD), unspecified  ADHD type  Controlled.  - amphetamine-dextroamphetamine (ADDERALL) 20 MG per tablet; Take 1 tablet (20 mg) by mouth 2 times daily  Dispense: 60 tablet; Refill: 0  - amphetamine-dextroamphetamine (ADDERALL) 20 MG per tablet; Take 1 tablet (20 mg) by mouth 2 times daily  Dispense: 60 tablet; Refill: 0  - amphetamine-dextroamphetamine (ADDERALL) 20 MG per tablet; Take 1 tablet (20 mg) by mouth 2 times daily  Dispense: 60 tablet; Refill: 0    7. Screening for HIV without presence of risk factors  Controlled.  - HIV Antigen Antibody Combo    Home care instructions were reviewed with the patient. The risks, benefits and treatment options of prescribed medications or other treatments have been discussed with the patient. The patient verbalized their understanding and should call or follow up if no improvement or if they develop further problems.    Patient Instructions   Wean off the Paxil    Increase the hydrochlorothiazide to 25 mg-MyChart blood pressures in 1-2 weeks following increase    Labs today-we will notify you with those results      SHRUTHI Arellano [facility]

## 2018-09-26 LAB — HIV 1+2 AB+HIV1 P24 AG SERPL QL IA: NONREACTIVE

## 2018-09-26 ASSESSMENT — PATIENT HEALTH QUESTIONNAIRE - PHQ9: SUM OF ALL RESPONSES TO PHQ QUESTIONS 1-9: 2

## 2018-09-27 RX ORDER — METFORMIN HCL 500 MG
1000 TABLET, EXTENDED RELEASE 24 HR ORAL 2 TIMES DAILY WITH MEALS
Qty: 360 TABLET | Refills: 1 | Status: SHIPPED | OUTPATIENT
Start: 2018-09-27

## 2018-10-01 PROBLEM — E78.5 HYPERLIPIDEMIA LDL GOAL <130: Status: ACTIVE | Noted: 2018-10-01

## 2018-10-01 PROBLEM — F33.0 MILD EPISODE OF RECURRENT MAJOR DEPRESSIVE DISORDER (H): Status: ACTIVE | Noted: 2018-10-01

## 2018-10-16 DIAGNOSIS — F33.0 MILD EPISODE OF RECURRENT MAJOR DEPRESSIVE DISORDER (H): ICD-10-CM

## 2018-10-16 RX ORDER — PAROXETINE 20 MG/1
TABLET, FILM COATED ORAL
Qty: 90 TABLET | Refills: 1 | Status: SHIPPED | OUTPATIENT
Start: 2018-10-16

## 2018-10-16 NOTE — TELEPHONE ENCOUNTER
"Requested Prescriptions   Pending Prescriptions Disp Refills     PARoxetine (PAXIL) 20 MG tablet [Pharmacy Med Name: PAROXETINE 20MG TABLETS] 90 tablet 0     Sig: TAKE 1 TABLET BY MOUTH EVERY MORNING    SSRIs Protocol Passed    10/16/2018  3:53 AM       Passed - PHQ-9 score less than 5 in past 6 months    Please review last PHQ-9 score.          Passed - Patient is age 18 or older       Passed - No active pregnancy on record       Passed - No positive pregnancy test in last 12 months       Passed - Recent (6 mo) or future (30 days) visit within the authorizing provider's specialty    Patient had office visit in the last 6 months or has a visit in the next 30 days with authorizing provider or within the authorizing provider's specialty.  See \"Patient Info\" tab in inbasket, or \"Choose Columns\" in Meds & Orders section of the refill encounter.            Last Written Prescription Date:  10/16/18  Last Fill Quantity: 90,  # refills: 0   Last office visit: 9/25/2018 with prescribing provider:  NOELLE   Future Office Visit:      "

## 2018-10-24 ENCOUNTER — TELEPHONE (OUTPATIENT)
Dept: FAMILY MEDICINE | Facility: CLINIC | Age: 38
End: 2018-10-24

## 2018-11-12 ENCOUNTER — TELEPHONE (OUTPATIENT)
Dept: ALLERGY | Facility: CLINIC | Age: 38
End: 2018-11-12

## 2018-11-12 DIAGNOSIS — Z88.0 PENICILLIN ALLERGY: Primary | ICD-10-CM

## 2018-11-12 NOTE — TELEPHONE ENCOUNTER
Reason for Call:  Other appointm,ent    Detailed comments: Pt needs to schedule allergy testing, please call her    Phone Number Patient can be reached at: Cell number on file:    Telephone Information:   Mobile [phone]       Best Time: today    Can we leave a detailed message on this number? YES    Call taken on 11/12/2018 at 3:24 PM by Shameka Cortes

## 2018-11-12 NOTE — TELEPHONE ENCOUNTER
Patient returned call to clinic to schedule. Advised her that RN will contact her later today to schedule this apt.  Sintia Ma MA

## 2018-11-12 NOTE — TELEPHONE ENCOUNTER
Called and spoke with pt regarding PCN testing. Informed of medications to be held. Agreeable. Informed her that she needs to be healthy and to call clinic if her schedule should change. Agreeable.     Appointment scheduled for 11/21/2018. PCN kit needed.    Marguerite MARTINEZ   Allergy RN

## 2018-11-12 NOTE — TELEPHONE ENCOUNTER
Left message on machine to call back    Pt needs scheduling for PCN testing/challenge (done by allergy RN only).    Marguerite MARTINEZ   Allergy RN

## 2018-11-19 ENCOUNTER — MYC MEDICAL ADVICE (OUTPATIENT)
Dept: FAMILY MEDICINE | Facility: CLINIC | Age: 38
End: 2018-11-19

## 2018-11-19 DIAGNOSIS — I10 BENIGN ESSENTIAL HYPERTENSION: Primary | ICD-10-CM

## 2018-11-19 DIAGNOSIS — R73.03 PRE-DIABETES: ICD-10-CM

## 2018-11-20 RX ORDER — NEBIVOLOL 10 MG/1
10 TABLET ORAL DAILY
Qty: 30 TABLET | Refills: 3 | Status: SHIPPED | OUTPATIENT
Start: 2018-11-20 | End: 2018-12-20

## 2018-11-21 ENCOUNTER — OFFICE VISIT (OUTPATIENT)
Dept: ALLERGY | Facility: CLINIC | Age: 38
End: 2018-11-21
Payer: COMMERCIAL

## 2018-11-21 VITALS
HEART RATE: 113 BPM | DIASTOLIC BLOOD PRESSURE: 118 MMHG | OXYGEN SATURATION: 95 % | TEMPERATURE: 97.8 F | SYSTOLIC BLOOD PRESSURE: 179 MMHG

## 2018-11-21 DIAGNOSIS — J31.0 NONALLERGIC RHINITIS: Primary | ICD-10-CM

## 2018-11-21 DIAGNOSIS — I10 BENIGN ESSENTIAL HYPERTENSION: ICD-10-CM

## 2018-11-21 PROCEDURE — 99207 ZZC DROP WITH A PROCEDURE: CPT | Mod: 25 | Performed by: ALLERGY & IMMUNOLOGY

## 2018-11-21 PROCEDURE — 95004 PERQ TESTS W/ALRGNC XTRCS: CPT | Performed by: ALLERGY & IMMUNOLOGY

## 2018-11-21 RX ORDER — LISINOPRIL 10 MG/1
TABLET ORAL
Qty: 90 TABLET | Refills: 0 | OUTPATIENT
Start: 2018-11-21

## 2018-11-21 NOTE — PROGRESS NOTES
Alma Mae was seen in the Allergy Clinic at [facility]. The following are my recommendations regarding her Nonallergic Rhinitis    1. No evidence of aeroallergen sensitization  2. Continue fluticasone nasal spray, 2 sprays in each nostril daily  3. Continue loratadine 10mg daily - patient reports subjective improvement in symptoms with this medication  4. Continue sinus irrigation rinse daily as needed  5. Patient to follow up with Primary Care provider regarding elevated blood pressure.      Alma Mae is a 38 year old American female who is seen today for allergy testing and evaluation of penicillin allergy. She has been off of antihistamines for the past 7 days. Alma reports feeling well this morning and has no questions or concerns.      REVIEW OF SYSTEMS:  General: negative for weight gain. negative for weight loss. negative for changes in sleep.   Eyes: negative for itching. negative for redness. negative for tearing/watering. negative for vision changes  Ears: negative for fullness. negative for hearing loss. negative for dizziness.   Nose: negative for snoring.negative for changes in smell. positive  for drainage.   Throat: negative for hoarseness. negative for sore throat. negative for trouble swallowing.   Lungs: negative for cough. negative for shortness of breath.negative for wheezing. negative for sputum production.   Cardiovascular: negative for chest pain. negative for swelling of ankles. negative for fast or irregular heartbeat.   Gastrointestinal: negative for nausea. negative for heartburn. negative for acid reflux.   Musculoskeletal: negative for joint pain. negative for joint stiffness. negative for joint swelling.   Neurologic: negative for seizures. negative for fainting. negative for weakness.   Psychiatric: negative for changes in mood. negative for anxiety.   Endocrine: negative for cold intolerance. negative for heat intolerance. negative for tremors.    Hematologic: negative for easy bruising. negative for easy bleeding.  Integumentary: negative for rash. negative for scaling. negative for nail changes.       Current Outpatient Prescriptions:      amphetamine-dextroamphetamine (ADDERALL) 20 MG per tablet, Take 1 tablet (20 mg) by mouth 2 times daily, Disp: 60 tablet, Rfl: 0     [START ON 11/25/2018] amphetamine-dextroamphetamine (ADDERALL) 20 MG per tablet, Take 1 tablet (20 mg) by mouth 2 times daily, Disp: 60 tablet, Rfl: 0     amphetamine-dextroamphetamine (ADDERALL) 20 MG per tablet, Take 1 tablet (20 mg) by mouth 2 times daily, Disp: 60 tablet, Rfl: 0     blood glucose (NO BRAND SPECIFIED) lancets standard, Use to test blood sugars 1 times daily or as directed-has BD One Touch, Disp: 100 each, Rfl: 11     blood glucose monitoring (NO BRAND SPECIFIED) test strip, Use to test blood sugars 1 times daily or as directed-ONE TOUCH ULTRA, Disp: 100 strip, Rfl: 11     blood glucose monitoring (NO BRAND SPECIFIED) test strip, Use to test blood sugars 1 times daily or as directed -has BD One Touch, Disp: 100 strip, Rfl: 3     fluticasone (FLONASE) 50 MCG/ACT spray, Spray 2 sprays into both nostrils daily, Disp: 1 Bottle, Rfl: 11     hydrochlorothiazide (HYDRODIURIL) 25 MG tablet, Take 1 tablet (25 mg) by mouth daily, Disp: 90 tablet, Rfl: 1     metFORMIN (GLUCOPHAGE-XR) 500 MG 24 hr tablet, Take 2 tablets (1,000 mg) by mouth 2 times daily (with meals), Disp: 360 tablet, Rfl: 1     nebivolol (BYSTOLIC) 10 MG tablet, Take 1 tablet (10 mg) by mouth daily, Disp: 30 tablet, Rfl: 3     ondansetron (ZOFRAN) 4 MG tablet, Take 1 tablet (4 mg) by mouth every 6 hours as needed for nausea, Disp: 30 tablet, Rfl: 1     PARoxetine (PAXIL) 20 MG tablet, TAKE 1 TABLET BY MOUTH EVERY MORNING, Disp: 90 tablet, Rfl: 1     PENICILLIN G SKIN TEST (RAMIRO/DANIKA PROTOCOL) CMPD KIT, Kit to consist of:  Pre-Pen (0.25 mL), penicillin G 100,000 units/mL (5 mL), amoxicillin 250 mg/5mL (80 mL).,  Disp: 1 kit, Rfl: 0     [DISCONTINUED] metFORMIN (GLUCOPHAGE) 500 MG tablet, TAKE 1 TABLET(500 MG) BY MOUTH TWICE DAILY WITH MEALS, Disp: 180 tablet, Rfl: 1    EXAM:   BP (!) 179/118 (BP Location: Right arm, Patient Position: Sitting, Cuff Size: Adult Large)  Pulse 113  Temp 97.8  F (36.6  C)  SpO2 95%  GENERAL APPEARANCE: alert, cooperative and not in distress  SKIN: no rashes, no lesions  HEAD: atraumatic, normocephalic  ENT: no scars or lesions, tongue midline and normal, soft palate, uvula, and tonsils normal  NECK: no asymmetry, masses, or scars, supple without significant adenopathy  LUNGS: unlabored respirations, no intercostal retractions or accessory muscle use, clear to auscultation without rales or wheezes  HEART: regular rate and rhythm without murmurs and normal S1 and S2  MUSCULOSKELETAL: no musculoskeletal defects are noted  NEURO: no focal deficits noted  PSYCH: does not appear depressed or anxious      WORKUP:  Skin Testing    ENVIRONMENTAL PERCUTANEOUS SKIN TESTING: ADULT  [facility] 11/21/2018   Consent Y   Ordering Physician Dr. William   Interpreting Physician Dr. William   Testing Technician Radha SMITH   Location Back   Time start:  8:50 AM   Time End:  9:05 AM   Positive Control: Histatrol*ALK 1 mg/ml 6/32   Negative Control: 50% Glycerin 0   Cat Hair*ALK (10,000 BAU/ml) 0   AP Dog Hair/Dander (1:100 w/v) 0   Dust Mite p. 30,000 AU/ml 0   Dust Mite f. (30,000 AU/ml) 0   Tomas (W/F in millimeters) 0   Grass Mix #7 (100,000 BAU/ml) 0   Red Cedar (W/F in millimeters) 0   Maple/Mascot (W/F in millimeters) 0   Hackberry (W/F in millimeters) 0   [place] (W/F in millimeters) 0   Gomer *ALK (W/F in millimeters) 0   American Elm (W/F in millimeters) 0   Dixon (W/F in millimeters) 0   Black [place] (W/F in millimeters) 0   Birch Mix (W/F in millimeters) 0   [place] (W/F in millimeters) 0   Oak (W/F in millimeters) 0   Cocklebur (W/F in millimeters) 0   [place] (W/F in millimeters)  0   White Edmond (W/F in millimeters) 0   Careless (W/F in millimeters) 0   Nettle (W/F in millimeters) 0   English Plantain (W/F in millimeters) 0   Kochia (W/F in millimeters) 0   Lamb's Quarter (W/F in millimeters) 0   Marshelder (W/F in millimeters) 0   Ragweed Mix* ALK (W/F in millimeters) 0   Russian Thistle (W/F in millimeters) 0   Sagebrush/Mugwort (W/F in millimeters) 0   Sheep Sorrel (W/F in millimeters) 0   Feather Mix* ALK (W/F in millimeters) 0   Penicillium Mix (1:10 w/v) 0   Curvularia spicifera (1:10 w/v) 0   Epicoccum (1:10 w/v) 0   Aspergillus fumigatus (1:10 w/v): 0   Alternaria tenius (1:10 w/v) 0   H. Cladosporium (1:10 w/v) 0   Phoma herbarum (1:10 w/v) 0        ASSESSMENT/PLAN:  Alma Mae is a 38 year old female here for allergy testing and evaluation of penicillin allergy. Her blood pressure was significantly elevated today though she denied any symptoms of chest pain, shortness of breath, headache, or dizziness. She states that she discontinued the lisinopril as she developed angioedema and has not yet started her new medication. Due to her elevated blood pressure, testing and drug challenge for penicillin allergy were not performed today. She was extensively counseled regarding the risks of skin prick testing for aeroallergen testing and provided consent for this procedure.    1. No evidence of aeroallergen sensitization  2. Continue fluticasone nasal spray, 2 sprays in each nostril daily  3. Continue loratadine 10mg daily - patient reports subjective improvement in symptoms with this medication  4. Continue sinus irrigation rinse daily as needed  5. Patient to follow up with Primary Care provider regarding elevated blood pressure.      Bhavana William MD  Allergy/Immunology  [facility] [place], [place]      Chart documentation done in part with Dragon Voice Recognition Software. Although reviewed after completion, some word and grammatical errors may remain.

## 2018-11-21 NOTE — LETTER
11/21/2018         RE: Alma Mae  [address] [facility] [address]        Dear Colleague,    Thank you for referring your patient, Alma Mae, to the [facility]. Please see a copy of my visit note below.    Alma Mae was seen in the Allergy Clinic at [facility]. The following are my recommendations regarding her Nonallergic Rhinitis    1. No evidence of aeroallergen sensitization  2. Continue fluticasone nasal spray, 2 sprays in each nostril daily  3. Continue loratadine 10mg daily - patient reports subjective improvement in symptoms with this medication  4. Continue sinus irrigation rinse daily as needed  5. Patient to follow up with Primary Care provider regarding elevated blood pressure.      Alma Mae is a 38 year old American female who is seen today for allergy testing and evaluation of penicillin allergy. She has been off of antihistamines for the past 7 days. Alma reports feeling well this morning and has no questions or concerns.      REVIEW OF SYSTEMS:  General: negative for weight gain. negative for weight loss. negative for changes in sleep.   Eyes: negative for itching. negative for redness. negative for tearing/watering. negative for vision changes  Ears: negative for fullness. negative for hearing loss. negative for dizziness.   Nose: negative for snoring.negative for changes in smell. positive  for drainage.   Throat: negative for hoarseness. negative for sore throat. negative for trouble swallowing.   Lungs: negative for cough. negative for shortness of breath.negative for wheezing. negative for sputum production.   Cardiovascular: negative for chest pain. negative for swelling of ankles. negative for fast or irregular heartbeat.   Gastrointestinal: negative for nausea. negative for heartburn. negative for acid reflux.   Musculoskeletal: negative for joint pain. negative for joint stiffness. negative for joint swelling.   Neurologic:  negative for seizures. negative for fainting. negative for weakness.   Psychiatric: negative for changes in mood. negative for anxiety.   Endocrine: negative for cold intolerance. negative for heat intolerance. negative for tremors.   Hematologic: negative for easy bruising. negative for easy bleeding.  Integumentary: negative for rash. negative for scaling. negative for nail changes.       Current Outpatient Prescriptions:      amphetamine-dextroamphetamine (ADDERALL) 20 MG per tablet, Take 1 tablet (20 mg) by mouth 2 times daily, Disp: 60 tablet, Rfl: 0     [START ON 11/25/2018] amphetamine-dextroamphetamine (ADDERALL) 20 MG per tablet, Take 1 tablet (20 mg) by mouth 2 times daily, Disp: 60 tablet, Rfl: 0     amphetamine-dextroamphetamine (ADDERALL) 20 MG per tablet, Take 1 tablet (20 mg) by mouth 2 times daily, Disp: 60 tablet, Rfl: 0     blood glucose (NO BRAND SPECIFIED) lancets standard, Use to test blood sugars 1 times daily or as directed-has BD One Touch, Disp: 100 each, Rfl: 11     blood glucose monitoring (NO BRAND SPECIFIED) test strip, Use to test blood sugars 1 times daily or as directed-ONE TOUCH ULTRA, Disp: 100 strip, Rfl: 11     blood glucose monitoring (NO BRAND SPECIFIED) test strip, Use to test blood sugars 1 times daily or as directed -has BD One Touch, Disp: 100 strip, Rfl: 3     fluticasone (FLONASE) 50 MCG/ACT spray, Spray 2 sprays into both nostrils daily, Disp: 1 Bottle, Rfl: 11     hydrochlorothiazide (HYDRODIURIL) 25 MG tablet, Take 1 tablet (25 mg) by mouth daily, Disp: 90 tablet, Rfl: 1     metFORMIN (GLUCOPHAGE-XR) 500 MG 24 hr tablet, Take 2 tablets (1,000 mg) by mouth 2 times daily (with meals), Disp: 360 tablet, Rfl: 1     nebivolol (BYSTOLIC) 10 MG tablet, Take 1 tablet (10 mg) by mouth daily, Disp: 30 tablet, Rfl: 3     ondansetron (ZOFRAN) 4 MG tablet, Take 1 tablet (4 mg) by mouth every 6 hours as needed for nausea, Disp: 30 tablet, Rfl: 1     PARoxetine (PAXIL) 20 MG tablet,  TAKE 1 TABLET BY MOUTH EVERY MORNING, Disp: 90 tablet, Rfl: 1     PENICILLIN G SKIN TEST (RAMIRO/DANIKA PROTOCOL) CMPD KIT, Kit to consist of:  Pre-Pen (0.25 mL), penicillin G 100,000 units/mL (5 mL), amoxicillin 250 mg/5mL (80 mL)., Disp: 1 kit, Rfl: 0     [DISCONTINUED] metFORMIN (GLUCOPHAGE) 500 MG tablet, TAKE 1 TABLET(500 MG) BY MOUTH TWICE DAILY WITH MEALS, Disp: 180 tablet, Rfl: 1    EXAM:   BP (!) 179/118 (BP Location: Right arm, Patient Position: Sitting, Cuff Size: Adult Large)  Pulse 113  Temp 97.8  F (36.6  C)  SpO2 95%  GENERAL APPEARANCE: alert, cooperative and not in distress  SKIN: no rashes, no lesions  HEAD: atraumatic, normocephalic  ENT: no scars or lesions, tongue midline and normal, soft palate, uvula, and tonsils normal  NECK: no asymmetry, masses, or scars, supple without significant adenopathy  LUNGS: unlabored respirations, no intercostal retractions or accessory muscle use, clear to auscultation without rales or wheezes  HEART: regular rate and rhythm without murmurs and normal S1 and S2  MUSCULOSKELETAL: no musculoskeletal defects are noted  NEURO: no focal deficits noted  PSYCH: does not appear depressed or anxious      WORKUP:  Skin Testing    ENVIRONMENTAL PERCUTANEOUS SKIN TESTING: ADULT  [facility] [facility] 11/21/2018   Consent Y   Ordering Physician Dr. William   Interpreting Physician Dr. William   Testing Technician Radha SMITH   Location Back   Time start:  8:50 AM   Time End:  9:05 AM   Positive Control: Histatrol*ALK 1 mg/ml 6/32   Negative Control: 50% Glycerin 0   Cat Hair*ALK (10,000 BAU/ml) 0   AP Dog Hair/Dander (1:100 w/v) 0   Dust Mite p. 30,000 AU/ml 0   Dust Mite f. (30,000 AU/ml) 0   Tomas (W/F in millimeters) 0   Grass Mix #7 (100,000 BAU/ml) 0   Red Cedar (W/F in millimeters) 0   Maple/Pope (W/F in millimeters) 0   Hackberry (W/F in millimeters) 0   Wharton (W/F in millimeters) 0   Steward *ALK (W/F in millimeters) 0   [facility] (W/F in millimeters) 0    Dillingham (W/F in millimeters) 0   Black Sugar Run (W/F in millimeters) 0   Birch Mix (W/F in millimeters) 0   Stanford (W/F in millimeters) 0   Oak (W/F in millimeters) 0   Cocklebur (W/F in millimeters) 0   Carnelian Bay (W/F in millimeters) 0   White Edmond (W/F in millimeters) 0   Careless (W/F in millimeters) 0   Nettle (W/F in millimeters) 0   English Plantain (W/F in millimeters) 0   Kochia (W/F in millimeters) 0   Lamb's Quarter (W/F in millimeters) 0   Marshelder (W/F in millimeters) 0   Ragweed Mix* ALK (W/F in millimeters) 0   Russian Thistle (W/F in millimeters) 0   Sagebrush/Mugwort (W/F in millimeters) 0   Sheep Sorrel (W/F in millimeters) 0   Feather Mix* ALK (W/F in millimeters) 0   Penicillium Mix (1:10 w/v) 0   Curvularia spicifera (1:10 w/v) 0   Epicoccum (1:10 w/v) 0   Aspergillus fumigatus (1:10 w/v): 0   Alternaria tenius (1:10 w/v) 0   H. Cladosporium (1:10 w/v) 0   Phoma herbarum (1:10 w/v) 0        ASSESSMENT/PLAN:  Alma Mae is a 38 year old female here for allergy testing and evaluation of penicillin allergy. Her blood pressure was significantly elevated today though she denied any symptoms of chest pain, shortness of breath, headache, or dizziness. She states that she discontinued the lisinopril as she developed angioedema and has not yet started her new medication. Due to her elevated blood pressure, testing and drug challenge for penicillin allergy were not performed today. She was extensively counseled regarding the risks of skin prick testing for aeroallergen testing and provided consent for this procedure.    1. No evidence of aeroallergen sensitization  2. Continue fluticasone nasal spray, 2 sprays in each nostril daily  3. Continue loratadine 10mg daily - patient reports subjective improvement in symptoms with this medication  4. Continue sinus irrigation rinse daily as needed  5. Patient to follow up with Primary Care provider regarding elevated blood pressure.      Bhavana William,  MD  Allergy/Immunology  [facility] [place], [place]      Chart documentation done in part with Dragon Voice Recognition Software. Although reviewed after completion, some word and grammatical errors may remain.      Again, thank you for allowing me to participate in the care of your patient.        Sincerely,        Bhavana William MD

## 2018-11-21 NOTE — NURSING NOTE
Per provider verbal order, RN placed Adult Environmental  panels.  Consent was obtained prior to procedure.  Once panels were placed, patient was monitored for 15 minutes in clinic.  RN read test after 15 minutes and provider was notified of results.  Pt tolerated procedure well.  All questions and concerns were addressed at office visit.     Gilma Trinidad RN

## 2018-11-21 NOTE — MR AVS SNAPSHOT
After Visit Summary   11/21/2018    Alma Mae    MRN: [id number]           Patient Information     Date Of Birth          1980        Visit Information        Provider Department      11/21/2018 8:00 AM Bhavana William MD [facility]        Today's Diagnoses     Nonallergic rhinitis    -  1       Follow-ups after your visit        Who to contact     If you have questions or need follow up information about today's clinic visit or your schedule please contact [facility] directly at [phone].  Normal or non-critical lab and imaging results will be communicated to you by [employer], letter or phone within 4 business days after the clinic has received the results. If you do not hear from us within 7 days, please contact the clinic through [employer] or phone. If you have a critical or abnormal lab result, we will notify you by phone as soon as possible.  Submit refill requests through [employer] or call your pharmacy and they will forward the refill request to us. Please allow 3 business days for your refill to be completed.          Additional Information About Your Visit        MyChart Information     [employer] gives you secure access to your electronic health record. If you see a primary care provider, you can also send messages to your care team and make appointments. If you have questions, please call your primary care clinic.  If you do not have a primary care provider, please call [phone] and they will assist you.        Care EveryWhere ID     This is your Care EveryWhere ID. This could be used by other organizations to access your [facility] medical records  [id number]        Your Vitals Were     Pulse Temperature Pulse Oximetry             113 97.8  F (36.6  C) 95%          Blood Pressure from Last 3 Encounters:   11/21/18 (!) 179/118   09/25/18 (!) 150/102   09/19/18 (!) 143/92    Weight from Last 3 Encounters:   09/25/18 101.6 kg (224 lb)   09/19/18 101.8 kg (224  lb 6.9 oz)   03/22/18 99.9 kg (220 lb 3.2 oz)              We Performed the Following     ALLERGY SKIN TESTS,ALLERGENS          Today's Medication Changes          These changes are accurate as of 11/21/18  9:33 AM.  If you have any questions, ask your nurse or doctor.               These medicines have changed or have updated prescriptions.        Dose/Directions    metFORMIN 500 MG 24 hr tablet   Commonly known as:  GLUCOPHAGE-XR   This may have changed:  Another medication with the same name was removed. Continue taking this medication, and follow the directions you see here.   Used for:  Pre-diabetes   Changed by:  Bhavana William MD        Dose:  1000 mg   Take 2 tablets (1,000 mg) by mouth 2 times daily (with meals)   Quantity:  360 tablet   Refills:  1         Stop taking these medicines if you haven't already. Please contact your care team if you have questions.     PENICILLIN G SKIN TEST CMPD KIT   Commonly known as:  RAMIRO/DANIKA PROTOCOL   Stopped by:  Bhavana William MD                    Primary Care Provider Office Phone # Fax #    Daniela Tamayo SHRUTHI Maldonado [phone] [address] [place], [place] [id number]        Equal Access to Services     [facility] : Hadii analia collado hadasho Sojeremy, waaxda luqadaha, qaybta kaalmada adepasquale, shlomo silva . So [facility] [phone].    ATENCIÓN: Si habla español, tiene a graham disposición servicios gratuitos de asistencia lingüística. [facility] [phone].    We comply with applicable federal civil rights laws and [place] laws. We do not discriminate on the basis of race, color, national origin, age, disability, sex, sexual orientation, or gender identity.            Thank you!     Thank you for choosing [facility]  for your care. Our goal is always to provide you with excellent care. Hearing back from our patients is one way we can continue to improve our services. Please take a few minutes to complete the  written survey that you may receive in the mail after your visit with us. Thank you!             Your Updated Medication List - Protect others around you: Learn how to safely use, store and throw away your medicines at www.disposemymeds.org.          This list is accurate as of 11/21/18  9:33 AM.  Always use your most recent med list.                   Brand Name Dispense Instructions for use Diagnosis    * amphetamine-dextroamphetamine 20 MG tablet    ADDERALL    60 tablet    Take 1 tablet (20 mg) by mouth 2 times daily    Attention deficit hyperactivity disorder (ADHD), predominantly inattentive type       * amphetamine-dextroamphetamine 20 MG tablet    ADDERALL    60 tablet    Take 1 tablet (20 mg) by mouth 2 times daily    Attention deficit hyperactivity disorder (ADHD), unspecified ADHD type       * amphetamine-dextroamphetamine 20 MG tablet   Start taking on:  11/25/2018    ADDERALL    60 tablet    Take 1 tablet (20 mg) by mouth 2 times daily    Attention deficit hyperactivity disorder (ADHD), unspecified ADHD type       blood glucose lancets standard    no brand specified    100 each    Use to test blood sugars 1 times daily or as directed-has BD One Touch    Pre-diabetes       * blood glucose monitoring test strip    no brand specified    100 strip    Use to test blood sugars 1 times daily or as directed -has BD One Touch    Pre-diabetes       * blood glucose monitoring test strip    no brand specified    100 strip    Use to test blood sugars 1 times daily or as directed-ONE TOUCH ULTRA    Benign essential hypertension, Pre-diabetes       fluticasone 50 MCG/ACT spray    FLONASE    1 Bottle    Spray 2 sprays into both nostrils daily    Other allergic rhinitis       hydrochlorothiazide 25 MG tablet    HYDRODIURIL    90 tablet    Take 1 tablet (25 mg) by mouth daily    Benign essential hypertension       metFORMIN 500 MG 24 hr tablet    GLUCOPHAGE-XR    360 tablet    Take 2 tablets (1,000 mg) by mouth 2 times  daily (with meals)    Pre-diabetes       nebivolol 10 MG tablet    BYSTOLIC    30 tablet    Take 1 tablet (10 mg) by mouth daily    Benign essential hypertension, Pre-diabetes       ondansetron 4 MG tablet    ZOFRAN    30 tablet    Take 1 tablet (4 mg) by mouth every 6 hours as needed for nausea    Nausea       PARoxetine 20 MG tablet    PAXIL    90 tablet    TAKE 1 TABLET BY MOUTH EVERY MORNING    Mild episode of recurrent major depressive disorder (H)       * Notice:  This list has 5 medication(s) that are the same as other medications prescribed for you. Read the directions carefully, and ask your doctor or other care provider to review them with you.

## 2018-11-21 NOTE — TELEPHONE ENCOUNTER
"Requested Prescriptions   Pending Prescriptions Disp Refills     lisinopril (PRINIVIL/ZESTRIL) 10 MG tablet [Pharmacy Med Name: LISINOPRIL 10MG TABLETS] 90 tablet 0     Sig: TAKE 1 TABLET(10 MG) BY MOUTH DAILY    ACE Inhibitors (Including Combos) Protocol Failed    11/21/2018  3:38 AM       Failed - Blood pressure under 140/90 in past 12 months    BP Readings from Last 3 Encounters:   11/21/18 (!) 169/114   09/25/18 (!) 150/102   09/19/18 (!) 143/92                Passed - Recent (12 mo) or future (30 days) visit within the authorizing provider's specialty    Patient had office visit in the last 12 months or has a visit in the next 30 days with authorizing provider or within the authorizing provider's specialty.  See \"Patient Info\" tab in inbasket, or \"Choose Columns\" in Meds & Orders section of the refill encounter.             Passed - Patient is age 18 or older       Passed - No active pregnancy on record       Passed - Normal serum creatinine on file in past 12 months    Recent Labs   Lab Test  09/25/18   1009   CR  0.60            Passed - Normal serum potassium on file in past 12 months    Recent Labs   Lab Test  09/25/18   1009   POTASSIUM  4.5            Passed - No positive pregnancy test in past 12 months        lisinopril (PRINIVIL/ZESTRIL) 10 MG tablet (Discontinued) 90 tablet 0 8/15/2018 9/19/2018 No   Sig: TAKE 1 TABLET(10 MG) BY MOUTH DAILY   Patient not taking: Reported on 9/19/2018        Class: E-Prescribe   Order: [id number]   E-Prescribing Status: Receipt confirmed by pharmacy (8/15/2018 10:42 AM CDT)       "

## 2018-12-12 DIAGNOSIS — R73.03 PRE-DIABETES: ICD-10-CM

## 2018-12-12 DIAGNOSIS — I10 BENIGN ESSENTIAL HYPERTENSION: ICD-10-CM

## 2018-12-12 NOTE — TELEPHONE ENCOUNTER
"Requested Prescriptions   Pending Prescriptions Disp Refills     hydrochlorothiazide (HYDRODIURIL) 12.5 MG tablet [Pharmacy Med Name: HYDROCHLOROTHIAZIDE 12.5MG TABLETS] 90 tablet 0     Sig: TAKE 1 TABLET(12.5 MG) BY MOUTH DAILY    Diuretics (Including Combos) Protocol Failed - 12/12/2018  3:41 AM       Failed - Blood pressure under 140/90 in past 12 months    BP Readings from Last 3 Encounters:   11/21/18 (!) 179/118   09/25/18 (!) 150/102 09/19/18 (!) 143/92                Passed - Recent (12 mo) or future (30 days) visit within the authorizing provider's specialty    Patient had office visit in the last 12 months or has a visit in the next 30 days with authorizing provider or within the authorizing provider's specialty.  See \"Patient Info\" tab in inbasket, or \"Choose Columns\" in Meds & Orders section of the refill encounter.      Last Written Prescription Date:  9/25/18  Last Fill Quantity: 90,  # refills: 1   Last office visit: 9/25/2018 with prescribing provider:     Future Office Visit:               Passed - Patient is age 18 or older       Passed - No active pregancy on record       Passed - Normal serum creatinine on file in past 12 months    Recent Labs   Lab Test 09/25/18  1009   CR 0.60             Passed - Normal serum potassium on file in past 12 months    Recent Labs   Lab Test 09/25/18  1009   POTASSIUM 4.5                   Passed - Normal serum sodium on file in past 12 months    Recent Labs   Lab Test 09/25/18  1009                Passed - No positive pregnancy test in past 12 months        metFORMIN (GLUCOPHAGE) 500 MG tablet [Pharmacy Med Name: METFORMIN 500MG TABLETS] 180 tablet 0     Sig: TAKE 1 TABLET(500 MG) BY MOUTH TWICE DAILY WITH MEALS    Biguanide Agents Failed - 12/12/2018  3:41 AM       Failed - Blood pressure less than 140/90 in past 6 months    BP Readings from Last 3 Encounters:   11/21/18 (!) 179/118   09/25/18 (!) 150/102 09/19/18 (!) 143/92                Failed - " "Patient has had a Microalbumin in the past 15 mos.    No lab results found.         Passed - Patient has documented LDL within the past 12 mos.    Recent Labs   Lab Test 09/25/18  1009   LDL Cannot estimate LDL when triglyceride exceeds 400 mg/dL  157*            Passed - Patient is age 10 or older       Passed - Patient has documented A1c within the specified period of time.    If HgbA1C is 8 or greater, it needs to be on file within the past 3 months.  If less than 8, must be on file within the past 6 months.     Recent Labs   Lab Test 09/25/18  1009   A1C 7.3*            Passed - Patient's CR is NOT>1.4 OR Patient's EGFR is NOT<45 within past 12 mos.    Recent Labs   Lab Test 09/25/18  1009   GFRESTIMATED >90   GFRESTBLACK >90       Recent Labs   Lab Test 09/25/18  1009   CR 0.60            Passed - Patient does NOT have a diagnosis of CHF.       Passed - Patient is not pregnant       Passed - Patient has not had a positive pregnancy test within the past 12 mos.        Passed - Recent (6 mo) or future (30 days) visit within the authorizing provider's specialty    Patient had office visit in the last 6 months or has a visit in the next 30 days with authorizing provider or within the authorizing provider's specialty.  See \"Patient Info\" tab in inbasket, or \"Choose Columns\" in Meds & Orders section of the refill encounter.      Last Written Prescription Date:  6/27/18  Last Fill Quantity: 360,  # refills: 1   Last office visit: 9/25/2018 with prescribing provider:     Future Office Visit:                "

## 2018-12-13 RX ORDER — HYDROCHLOROTHIAZIDE 12.5 MG/1
TABLET ORAL
Qty: 90 TABLET | Refills: 0 | OUTPATIENT
Start: 2018-12-13

## 2018-12-13 NOTE — TELEPHONE ENCOUNTER
She should have refills at her pharmacy.  Please call to check on blood pressure.     Thanks,     SHRUTHI Arellano CNP

## 2018-12-18 ENCOUNTER — MYC MEDICAL ADVICE (OUTPATIENT)
Dept: FAMILY MEDICINE | Facility: CLINIC | Age: 38
End: 2018-12-18

## 2018-12-19 ENCOUNTER — MYC REFILL (OUTPATIENT)
Dept: FAMILY MEDICINE | Facility: CLINIC | Age: 38
End: 2018-12-19

## 2018-12-19 DIAGNOSIS — F90.9 ATTENTION DEFICIT HYPERACTIVITY DISORDER (ADHD), UNSPECIFIED ADHD TYPE: ICD-10-CM

## 2018-12-20 DIAGNOSIS — I10 BENIGN ESSENTIAL HYPERTENSION: Primary | ICD-10-CM

## 2018-12-20 RX ORDER — NEBIVOLOL 20 MG/1
20 TABLET ORAL DAILY
Qty: 30 TABLET | Refills: 3 | Status: SHIPPED | OUTPATIENT
Start: 2018-12-20 | End: 2019-02-24

## 2018-12-20 NOTE — TELEPHONE ENCOUNTER
RX [place] ([place] reviewed:  reviewed- no concerns    [place]:  [email or web address]

## 2018-12-21 RX ORDER — DEXTROAMPHETAMINE SACCHARATE, AMPHETAMINE ASPARTATE, DEXTROAMPHETAMINE SULFATE AND AMPHETAMINE SULFATE 5; 5; 5; 5 MG/1; MG/1; MG/1; MG/1
20 TABLET ORAL 2 TIMES DAILY
Qty: 60 TABLET | Refills: 0 | Status: SHIPPED | OUTPATIENT
Start: 2018-12-21 | End: 2019-01-16

## 2018-12-23 DIAGNOSIS — R11.0 NAUSEA: ICD-10-CM

## 2018-12-23 NOTE — TELEPHONE ENCOUNTER
"Requested Prescriptions   Pending Prescriptions Disp Refills     ondansetron (ZOFRAN) 4 MG tablet  Last Written Prescription Date:  11/19/18  Last Fill Quantity: 30,  # refills: 1   Last office visit: 9/25/2018 with prescribing provider:  OLGA Maldonado   Future Office Visit:     30 tablet 0     Sig: TAKE 1 TABLET(4 MG) BY MOUTH EVERY 6 HOURS AS NEEDED FOR NAUSEA     Antivertigo/Antiemetic Agents Passed - 12/23/2018  3:49 AM       Passed - Recent (12 mo) or future (30 days) visit within the authorizing provider's specialty    Patient had office visit in the last 12 months or has a visit in the next 30 days with authorizing provider or within the authorizing provider's specialty.  See \"Patient Info\" tab in inbasket, or \"Choose Columns\" in Meds & Orders section of the refill encounter.             Passed - Patient is 18 years of age or older          "

## 2018-12-24 RX ORDER — ONDANSETRON 4 MG/1
TABLET, FILM COATED ORAL
Qty: 30 TABLET | Refills: 0 | Status: SHIPPED | OUTPATIENT
Start: 2018-12-24 | End: 2019-01-03

## 2018-12-24 NOTE — TELEPHONE ENCOUNTER
Prescription approved per [facility] Refill Protocol.    Candace PAVON RN

## 2018-12-26 ENCOUNTER — TELEPHONE (OUTPATIENT)
Dept: FAMILY MEDICINE | Facility: CLINIC | Age: 38
End: 2018-12-26

## 2018-12-26 NOTE — TELEPHONE ENCOUNTER
Prior Authorization Retail Medication Request    Medication/Dose:   ICD code (if different than what is on RX):    Previously Tried and Failed:    Rationale:      Insurance Name:  [employer]   Insurance ID:  [id number]  [facility] [phone]      Pharmacy Information (if different than what is on RX)  Name:  Alida PAINTER  Phone:  [phone]

## 2018-12-26 NOTE — TELEPHONE ENCOUNTER
This PA request was submitted to the insurance by the Central Prior Authorization Team.  If you have any questions in regards to this request, we can be reached at [phone].     Thanks    PA Initiation    Medication: amphetamine-dextroamphetamine (ADDERALL) 20 MG tablet  Insurance Company: [employer] - Phone [phone] Fax [phone]  Pharmacy Filling the Rx: [facility] [address] [place] [address] AT [address]  Filling Pharmacy Phone: [phone]  Filling Pharmacy Fax:    Start Date: 12/26/2018

## 2018-12-27 NOTE — TELEPHONE ENCOUNTER
Prior Authorization Approval    Authorization Effective Date: 12/26/2018  Authorization Expiration Date: 12/26/2019  Medication: amphetamine-dextroamphetamine (ADDERALL) 20 MG tablet - APPROVED   Approved Dose/Quantity:   Reference #:     Insurance Company: [employer] - Phone [phone] Fax [phone]  Expected CoPay:       CoPay Card Available:      Foundation Assistance Needed:    Which Pharmacy is filling the prescription (Not needed for infusion/clinic administered): [facility] [address] [place] [address] AT [address]  Pharmacy Notified: Yes  Patient Notified: Yes

## 2019-01-03 DIAGNOSIS — R11.0 NAUSEA: ICD-10-CM

## 2019-01-03 RX ORDER — ONDANSETRON 4 MG/1
TABLET, FILM COATED ORAL
Qty: 30 TABLET | Refills: 0 | Status: SHIPPED | OUTPATIENT
Start: 2019-01-03 | End: 2019-01-07

## 2019-01-03 NOTE — TELEPHONE ENCOUNTER
"Requested Prescriptions   Pending Prescriptions Disp Refills     ondansetron (ZOFRAN) 4 MG tablet [Pharmacy Med Name: ONDANSETRON 4MG TABLETS] 30 tablet 0     Sig: TAKE 1 TABLET(4 MG) BY MOUTH EVERY 6 HOURS AS NEEDED FOR NAUSEA     Antivertigo/Antiemetic Agents Passed - 1/3/2019  3:38 AM       Passed - Recent (12 mo) or future (30 days) visit within the authorizing provider's specialty    Patient had office visit in the last 12 months or has a visit in the next 30 days with authorizing provider or within the authorizing provider's specialty.  See \"Patient Info\" tab in inbasket, or \"Choose Columns\" in Meds & Orders section of the refill encounter.             Passed - Patient is 18 years of age or older        Last Written Prescription Date:  12/24/18  Last Fill Quantity: 30,  # refills: 0   Last office visit: 9/25/2018 with prescribing provider:     Future Office Visit:        "

## 2019-01-07 DIAGNOSIS — R11.0 NAUSEA: ICD-10-CM

## 2019-01-07 RX ORDER — ONDANSETRON 4 MG/1
TABLET, FILM COATED ORAL
Qty: 30 TABLET | Refills: 0 | Status: SHIPPED | OUTPATIENT
Start: 2019-01-07 | End: 2019-01-17

## 2019-01-07 NOTE — TELEPHONE ENCOUNTER
"Requested Prescriptions   Pending Prescriptions Disp Refills     ondansetron (ZOFRAN) 4 MG tablet [Pharmacy Med Name: ONDANSETRON 4MG TABLETS] 30 tablet 0     Sig: TAKE 1 TABLET(4 MG) BY MOUTH EVERY 6 HOURS AS NEEDED FOR NAUSEA     Antivertigo/Antiemetic Agents Passed - 1/7/2019  3:38 AM       Passed - Recent (12 mo) or future (30 days) visit within the authorizing provider's specialty    Patient had office visit in the last 12 months or has a visit in the next 30 days with authorizing provider or within the authorizing provider's specialty.  See \"Patient Info\" tab in inbasket, or \"Choose Columns\" in Meds & Orders section of the refill encounter.      Last Written Prescription Date:  1/3/19  Last Fill Quantity: 30,  # refills: 0   Last office visit: 9/25/2018 with prescribing provider:     Future Office Visit:               Passed - Medication is active on med list       Passed - Patient is 18 years of age or older          "

## 2019-01-16 ENCOUNTER — MYC MEDICAL ADVICE (OUTPATIENT)
Dept: FAMILY MEDICINE | Facility: CLINIC | Age: 39
End: 2019-01-16

## 2019-01-16 ENCOUNTER — MYC REFILL (OUTPATIENT)
Dept: FAMILY MEDICINE | Facility: CLINIC | Age: 39
End: 2019-01-16

## 2019-01-16 DIAGNOSIS — F90.9 ATTENTION DEFICIT HYPERACTIVITY DISORDER (ADHD), UNSPECIFIED ADHD TYPE: ICD-10-CM

## 2019-01-16 NOTE — TELEPHONE ENCOUNTER
Quinton Marquez,     I am happy to see your blood pressure is coming down but it is still not controlled.  I would like you to increase the Bystolic to 20 mg daily.  I sent a new prescription to your pharmacy.  You can take 2 of the 10 mg tabs until you run out.  Please let me know what your blood pressure is when you are on the increased dose.     Thanks,     SHRUTHI Arellano CNP      Last read by Alma Mae at 2:49 PM on 1/16/2019.

## 2019-01-17 DIAGNOSIS — R11.0 NAUSEA: ICD-10-CM

## 2019-01-17 RX ORDER — ONDANSETRON 4 MG/1
TABLET, FILM COATED ORAL
Qty: 30 TABLET | Refills: 0 | Status: SHIPPED | OUTPATIENT
Start: 2019-01-17 | End: 2019-01-24

## 2019-01-17 RX ORDER — DEXTROAMPHETAMINE SACCHARATE, AMPHETAMINE ASPARTATE, DEXTROAMPHETAMINE SULFATE AND AMPHETAMINE SULFATE 5; 5; 5; 5 MG/1; MG/1; MG/1; MG/1
20 TABLET ORAL 2 TIMES DAILY
Qty: 60 TABLET | Refills: 0 | Status: SHIPPED | OUTPATIENT
Start: 2019-01-17

## 2019-01-17 NOTE — TELEPHONE ENCOUNTER
"Requested Prescriptions   Pending Prescriptions Disp Refills     ondansetron (ZOFRAN) 4 MG tablet [Pharmacy Med Name: ONDANSETRON 4MG TABLETS] 30 tablet 0     Sig: TAKE 1 TABLET(4 MG) BY MOUTH EVERY 6 HOURS AS NEEDED FOR NAUSEA     Antivertigo/Antiemetic Agents Passed - 1/17/2019  3:39 AM       Passed - Recent (12 mo) or future (30 days) visit within the authorizing provider's specialty    Patient had office visit in the last 12 months or has a visit in the next 30 days with authorizing provider or within the authorizing provider's specialty.  See \"Patient Info\" tab in inbasket, or \"Choose Columns\" in Meds & Orders section of the refill encounter.             Passed - Medication is active on med list       Passed - Patient is 18 years of age or older        Last Written Prescription Date:  1/7/19  Last Fill Quantity: 30,  # refills: 0   Last office visit: 9/25/2018 with prescribing provider:  Daniela Maldonado     Future Office Visit:      "

## 2019-01-22 ENCOUNTER — TRANSFERRED RECORDS (OUTPATIENT)
Dept: HEALTH INFORMATION MANAGEMENT | Facility: CLINIC | Age: 39
End: 2019-01-22

## 2019-01-24 DIAGNOSIS — R11.0 NAUSEA: ICD-10-CM

## 2019-01-25 RX ORDER — ONDANSETRON 4 MG/1
TABLET, FILM COATED ORAL
Qty: 30 TABLET | Refills: 3 | Status: SHIPPED | OUTPATIENT
Start: 2019-01-25

## 2019-01-25 NOTE — TELEPHONE ENCOUNTER
"Requested Prescriptions   Pending Prescriptions Disp Refills     ondansetron (ZOFRAN) 4 MG tablet [Pharmacy Med Name: ONDANSETRON 4MG TABLETS] 30 tablet 0     Sig: TAKE 1 TABLET(4 MG) BY MOUTH EVERY 6 HOURS AS NEEDED FOR NAUSEA     Antivertigo/Antiemetic Agents Passed - 1/24/2019  4:25 PM       Passed - Recent (12 mo) or future (30 days) visit within the authorizing provider's specialty    Patient had office visit in the last 12 months or has a visit in the next 30 days with authorizing provider or within the authorizing provider's specialty.  See \"Patient Info\" tab in inbasket, or \"Choose Columns\" in Meds & Orders section of the refill encounter.             Passed - Medication is active on med list       Passed - Patient is 18 years of age or older        ondansetron (ZOFRAN) 4 MG tablet  Last Written Prescription Date:  01/172019  Last Fill Quantity: 30 tablet,  # refills: 0   Last office visit: 9/25/2018 with prescribing provider:  STU Maldonado   Future Office Visit:      Bee JOHNSTON (R) (M)    "

## 2019-02-13 ENCOUNTER — MYC MEDICAL ADVICE (OUTPATIENT)
Dept: FAMILY MEDICINE | Facility: CLINIC | Age: 39
End: 2019-02-13

## 2019-02-13 ENCOUNTER — MYC REFILL (OUTPATIENT)
Dept: FAMILY MEDICINE | Facility: CLINIC | Age: 39
End: 2019-02-13

## 2019-02-13 DIAGNOSIS — F90.0 ATTENTION DEFICIT HYPERACTIVITY DISORDER (ADHD), PREDOMINANTLY INATTENTIVE TYPE: ICD-10-CM

## 2019-02-13 RX ORDER — DEXTROAMPHETAMINE SACCHARATE, AMPHETAMINE ASPARTATE, DEXTROAMPHETAMINE SULFATE AND AMPHETAMINE SULFATE 5; 5; 5; 5 MG/1; MG/1; MG/1; MG/1
20 TABLET ORAL 2 TIMES DAILY
Qty: 60 TABLET | Refills: 0 | Status: CANCELLED | OUTPATIENT
Start: 2019-02-13

## 2019-02-24 DIAGNOSIS — I10 BENIGN ESSENTIAL HYPERTENSION: ICD-10-CM

## 2019-02-25 RX ORDER — NEBIVOLOL HYDROCHLORIDE 20 MG/1
TABLET ORAL
Qty: 30 TABLET | Refills: 0 | Status: SHIPPED | OUTPATIENT
Start: 2019-02-25 | End: 2019-04-02

## 2019-02-25 NOTE — TELEPHONE ENCOUNTER
"Requested Prescriptions   Pending Prescriptions Disp Refills     BYSTOLIC 20 MG TABS [Pharmacy Med Name: BYSTOLIC 20MG TABLETS] 30 tablet 0     Sig: TAKE 1 TABLET BY MOUTH DAILY    Beta-Blockers Protocol Failed - 2/24/2019  2:38 AM       Failed - Blood pressure under 140/90 in past 12 months    BP Readings from Last 3 Encounters:   11/21/18 (!) 179/118   09/25/18 (!) 150/102   09/19/18 (!) 143/92                Passed - Patient is age 6 or older       Passed - Recent (12 mo) or future (30 days) visit within the authorizing provider's specialty    Patient had office visit in the last 12 months or has a visit in the next 30 days with authorizing provider or within the authorizing provider's specialty.  See \"Patient Info\" tab in inbasket, or \"Choose Columns\" in Meds & Orders section of the refill encounter.             Passed - Medication is active on med list        Last Written Prescription Date:  12/20/18  Last Fill Quantity: 30,  # refills: 3   Last office visit: 9/25/2018 with prescribing provider:     Future Office Visit:      "

## 2019-04-02 DIAGNOSIS — I10 BENIGN ESSENTIAL HYPERTENSION: ICD-10-CM

## 2019-04-02 RX ORDER — NEBIVOLOL HYDROCHLORIDE 20 MG/1
TABLET ORAL
Qty: 30 TABLET | Refills: 0 | Status: SHIPPED | OUTPATIENT
Start: 2019-04-02

## 2019-04-02 NOTE — TELEPHONE ENCOUNTER
"Requested Prescriptions   Pending Prescriptions Disp Refills     BYSTOLIC 20 MG TABS [Pharmacy Med Name: BYSTOLIC 20MG TABLETS] 30 tablet 0     Sig: TAKE 1 TABLET BY MOUTH EVERY DAY    Beta-Blockers Protocol Failed - 4/2/2019  2:38 AM       Failed - Blood pressure under 140/90 in past 12 months    BP Readings from Last 3 Encounters:   11/21/18 (!) 179/118   09/25/18 (!) 150/102   09/19/18 (!) 143/92                Passed - Patient is age 6 or older       Passed - Recent (12 mo) or future (30 days) visit within the authorizing provider's specialty    Patient had office visit in the last 12 months or has a visit in the next 30 days with authorizing provider or within the authorizing provider's specialty.  See \"Patient Info\" tab in inbasket, or \"Choose Columns\" in Meds & Orders section of the refill encounter.             Passed - Medication is active on med list        Last Written Prescription Date:  2/25/18  Last Fill Quantity: 30,  # refills: 0   Last office visit: 9/25/2018 with prescribing provider:     Future Office Visit:      "

## 2019-09-12 DIAGNOSIS — F33.0 MILD EPISODE OF RECURRENT MAJOR DEPRESSIVE DISORDER (H): ICD-10-CM

## 2019-09-12 NOTE — TELEPHONE ENCOUNTER
"Pt needs to be seen. Her last OV was 9/25/18 with instructions to return in about 3 months (around 12/25/2018) AND to wean off the Paxil.  Is she still taking Paxil?   I left a message for the patient to return call to clinic.  CSS please assist with scheduling appt.     Requested Prescriptions   Pending Prescriptions Disp Refills     PARoxetine (PAXIL) 20 MG tablet [Pharmacy Med Name: PAROXETINE HCL 20 MG TABLET] 90 tablet 2     Sig: TAKE ONE TABLET BY MOUTH EVERY MORNING       SSRIs Protocol Failed - 9/12/2019 11:37 AM        Failed - PHQ-9 score less than 5 in past 6 months     Please review last PHQ-9 score.           Failed - Recent (6 mo) or future (30 days) visit within the authorizing provider's specialty     Patient had office visit in the last 6 months or has a visit in the next 30 days with authorizing provider or within the authorizing provider's specialty.  See \"Patient Info\" tab in inbasket, or \"Choose Columns\" in Meds & Orders section of the refill encounter.            Passed - Medication is active on med list        Passed - Patient is age 18 or older        Passed - No active pregnancy on record        Passed - No positive pregnancy test in last 12 months        PHQ-9 SCORE 11/8/2017 9/25/2018   PHQ-9 Total Score MyChart - 2 (Minimal depression)   PHQ-9 Total Score 3 2       Last Written Prescription Date:  10/16/18  Last Fill Quantity: 90,  # refills: 1   Last office visit: 9/25/2018 with prescribing provider:  STU LUQUE   Future Office Visit:      Candace PAVON RN      "

## 2019-09-16 NOTE — TELEPHONE ENCOUNTER
2nd attempt. I left a message for the patient to return call to clinic.  Pt needs to be seen. Her last OV was 9/25/18 with instructions to return in about 3 months (around 12/25/2018) AND to wean off the Paxil.  Is she still taking Paxil?   CSS please assist with scheduling appt.     Candace PAVON RN

## 2019-09-20 RX ORDER — PAROXETINE 20 MG/1
TABLET, FILM COATED ORAL
Qty: 90 TABLET | Refills: 0 | OUTPATIENT
Start: 2019-09-20

## 2019-09-20 NOTE — TELEPHONE ENCOUNTER
Routing to provider, as this request is from 9/12/19.    3rd attempt. I left a message for the patient to return call to clinic.  Pt needs to be seen. Her last OV was 9/25/18 with instructions to return in about 3 months (around 12/25/2018) AND to wean off the Paxil.  Is she still taking Paxil?   CSS please assist with scheduling appt.      Candace PAVON RN

## 2019-12-16 ENCOUNTER — TRANSFERRED RECORDS (OUTPATIENT)
Dept: HEALTH INFORMATION MANAGEMENT | Facility: CLINIC | Age: 39
End: 2019-12-16

## 2019-12-16 LAB
CREAT SERPL-MCNC: 0.61 MG/DL (ref 0.51–0.95)
GFR SERPL CREATININE-BSD FRML MDRD: 113 ML/MIN/1.73M2
GLUCOSE SERPL-MCNC: 164 MG/DL (ref 60–99)
HBA1C MFR BLD: 7.2 % (ref 0–5.7)
POTASSIUM SERPL-SCNC: 4.2 MMOL/L (ref 3.4–4.7)

## 2020-03-11 ENCOUNTER — HEALTH MAINTENANCE LETTER (OUTPATIENT)
Age: 40
End: 2020-03-11

## 2020-12-27 ENCOUNTER — HEALTH MAINTENANCE LETTER (OUTPATIENT)
Age: 40
End: 2020-12-27

## 2021-04-25 ENCOUNTER — HEALTH MAINTENANCE LETTER (OUTPATIENT)
Age: 41
End: 2021-04-25

## 2021-10-09 ENCOUNTER — HEALTH MAINTENANCE LETTER (OUTPATIENT)
Age: 41
End: 2021-10-09

## 2022-05-21 ENCOUNTER — HEALTH MAINTENANCE LETTER (OUTPATIENT)
Age: 42
End: 2022-05-21

## 2022-09-17 ENCOUNTER — HEALTH MAINTENANCE LETTER (OUTPATIENT)
Age: 42
End: 2022-09-17

## 2023-06-04 ENCOUNTER — HEALTH MAINTENANCE LETTER (OUTPATIENT)
Age: 43
End: 2023-06-04

## 2024-02-24 ENCOUNTER — HEALTH MAINTENANCE LETTER (OUTPATIENT)
Age: 44
End: 2024-02-24